# Patient Record
Sex: FEMALE | Race: BLACK OR AFRICAN AMERICAN | NOT HISPANIC OR LATINO | URBAN - METROPOLITAN AREA
[De-identification: names, ages, dates, MRNs, and addresses within clinical notes are randomized per-mention and may not be internally consistent; named-entity substitution may affect disease eponyms.]

---

## 2017-01-18 ENCOUNTER — EMERGENCY (EMERGENCY)
Facility: HOSPITAL | Age: 22
LOS: 0 days | Discharge: HOME | End: 2017-01-18
Attending: EMERGENCY MEDICINE
Payer: MEDICAID

## 2017-01-18 VITALS
TEMPERATURE: 98 F | DIASTOLIC BLOOD PRESSURE: 74 MMHG | SYSTOLIC BLOOD PRESSURE: 120 MMHG | RESPIRATION RATE: 18 BRPM | OXYGEN SATURATION: 100 % | HEART RATE: 70 BPM | WEIGHT: 165.35 LBS

## 2017-01-18 DIAGNOSIS — J02.9 ACUTE PHARYNGITIS, UNSPECIFIED: ICD-10-CM

## 2017-01-18 DIAGNOSIS — Z88.0 ALLERGY STATUS TO PENICILLIN: ICD-10-CM

## 2017-01-18 DIAGNOSIS — Z90.89 ACQUIRED ABSENCE OF OTHER ORGANS: Chronic | ICD-10-CM

## 2017-01-18 PROCEDURE — 99283 EMERGENCY DEPT VISIT LOW MDM: CPT

## 2017-01-18 RX ORDER — AZITHROMYCIN 500 MG/1
500 TABLET, FILM COATED ORAL ONCE
Qty: 0 | Refills: 0 | Status: COMPLETED | OUTPATIENT
Start: 2017-01-18 | End: 2017-01-18

## 2017-01-18 RX ORDER — IBUPROFEN 200 MG
600 TABLET ORAL ONCE
Qty: 0 | Refills: 0 | Status: COMPLETED | OUTPATIENT
Start: 2017-01-18 | End: 2017-01-18

## 2017-01-18 RX ORDER — AZITHROMYCIN 500 MG/1
1 TABLET, FILM COATED ORAL
Qty: 3 | Refills: 0 | OUTPATIENT
Start: 2017-01-18 | End: 2017-01-21

## 2017-01-18 RX ADMIN — Medication 600 MILLIGRAM(S): at 20:20

## 2017-01-18 RX ADMIN — AZITHROMYCIN 500 MILLIGRAM(S): 500 TABLET, FILM COATED ORAL at 20:20

## 2017-01-18 NOTE — ED PROVIDER NOTE - CONSTITUTIONAL, MLM
normal... Well appearing, well nourished, awake, alert, oriented to person, place, time/situation and in no apparent distress. Speaking in clear full sentences no drooling no nasal flaring no shoulders retractions smiling

## 2017-01-18 NOTE — ED PROVIDER NOTE - THROAT FINDINGS
no vesicles/NO VESICLES/ULCERS/NO TONGUE ELEVATION/no exudate/NO DROOLING/THROAT RED/+ blood noted/NO STRIDOR

## 2017-01-18 NOTE — ED PROVIDER NOTE - OBJECTIVE STATEMENT
21 years old female walked c/o sore throat 4/10 cough nasal congestions for 3 days. Pt also c/o spitting bright red blood but no clots today. Pt sts she has a hx of tonsillectomy couple months ago. Pt denies headache, dizziness, sob, chest pain, difficulty of swallowing, nausea, vomiting, fever, chills, abd pain, dysuria, vaginal spotting or discharge or irregular bowel movements.

## 2017-02-07 ENCOUNTER — EMERGENCY (EMERGENCY)
Facility: HOSPITAL | Age: 22
LOS: 0 days | Discharge: HOME | End: 2017-02-08
Attending: EMERGENCY MEDICINE
Payer: MEDICAID

## 2017-02-07 VITALS
SYSTOLIC BLOOD PRESSURE: 108 MMHG | DIASTOLIC BLOOD PRESSURE: 71 MMHG | HEIGHT: 62 IN | OXYGEN SATURATION: 98 % | WEIGHT: 154.1 LBS | TEMPERATURE: 98 F | RESPIRATION RATE: 16 BRPM | HEART RATE: 16 BPM

## 2017-02-07 DIAGNOSIS — Z90.89 ACQUIRED ABSENCE OF OTHER ORGANS: Chronic | ICD-10-CM

## 2017-02-07 DIAGNOSIS — Z88.0 ALLERGY STATUS TO PENICILLIN: ICD-10-CM

## 2017-02-07 DIAGNOSIS — O21.9 VOMITING OF PREGNANCY, UNSPECIFIED: ICD-10-CM

## 2017-02-07 DIAGNOSIS — R11.10 VOMITING, UNSPECIFIED: ICD-10-CM

## 2017-02-07 PROCEDURE — 99284 EMERGENCY DEPT VISIT MOD MDM: CPT

## 2017-02-07 RX ORDER — SODIUM CHLORIDE 9 MG/ML
3 INJECTION INTRAMUSCULAR; INTRAVENOUS; SUBCUTANEOUS ONCE
Qty: 0 | Refills: 0 | Status: COMPLETED | OUTPATIENT
Start: 2017-02-07 | End: 2017-02-07

## 2017-02-07 RX ORDER — SODIUM CHLORIDE 9 MG/ML
1000 INJECTION INTRAMUSCULAR; INTRAVENOUS; SUBCUTANEOUS ONCE
Qty: 0 | Refills: 0 | Status: COMPLETED | OUTPATIENT
Start: 2017-02-07 | End: 2017-02-07

## 2017-02-07 RX ORDER — METOCLOPRAMIDE HCL 10 MG
10 TABLET ORAL ONCE
Qty: 0 | Refills: 0 | Status: COMPLETED | OUTPATIENT
Start: 2017-02-07 | End: 2017-02-08

## 2017-02-07 RX ORDER — KETOROLAC TROMETHAMINE 30 MG/ML
30 SYRINGE (ML) INJECTION ONCE
Qty: 0 | Refills: 0 | Status: DISCONTINUED | OUTPATIENT
Start: 2017-02-07 | End: 2017-02-07

## 2017-02-07 RX ORDER — PANTOPRAZOLE SODIUM 20 MG/1
40 TABLET, DELAYED RELEASE ORAL ONCE
Qty: 0 | Refills: 0 | Status: COMPLETED | OUTPATIENT
Start: 2017-02-07 | End: 2017-02-07

## 2017-02-07 NOTE — ED ADULT TRIAGE NOTE - CHIEF COMPLAINT QUOTE
I have been vomiting all day, around 4 times, and I have the chills.  I did have a fever earlier and I took tylenol around noon.  (no fever at triage)

## 2017-02-08 VITALS
DIASTOLIC BLOOD PRESSURE: 65 MMHG | SYSTOLIC BLOOD PRESSURE: 125 MMHG | OXYGEN SATURATION: 99 % | HEART RATE: 91 BPM | RESPIRATION RATE: 16 BRPM | TEMPERATURE: 98 F

## 2017-02-08 LAB
ALBUMIN SERPL ELPH-MCNC: 3.8 G/DL — SIGNIFICANT CHANGE UP (ref 3.3–5)
ALP SERPL-CCNC: 78 U/L — SIGNIFICANT CHANGE UP (ref 40–120)
ALT FLD-CCNC: 19 U/L — SIGNIFICANT CHANGE UP (ref 12–78)
ANION GAP SERPL CALC-SCNC: 9 MMOL/L — SIGNIFICANT CHANGE UP (ref 5–17)
APPEARANCE UR: CLEAR — SIGNIFICANT CHANGE UP
AST SERPL-CCNC: 14 U/L — LOW (ref 15–37)
BASOPHILS # BLD AUTO: 0.1 K/UL — SIGNIFICANT CHANGE UP (ref 0–0.2)
BASOPHILS NFR BLD AUTO: 1 % — SIGNIFICANT CHANGE UP (ref 0–2)
BILIRUB SERPL-MCNC: 0.2 MG/DL — SIGNIFICANT CHANGE UP (ref 0.2–1.2)
BILIRUB UR-MCNC: NEGATIVE — SIGNIFICANT CHANGE UP
BUN SERPL-MCNC: 13 MG/DL — SIGNIFICANT CHANGE UP (ref 7–23)
CALCIUM SERPL-MCNC: 8.4 MG/DL — LOW (ref 8.5–10.1)
CHLORIDE SERPL-SCNC: 107 MMOL/L — SIGNIFICANT CHANGE UP (ref 96–108)
CO2 SERPL-SCNC: 24 MMOL/L — SIGNIFICANT CHANGE UP (ref 22–31)
COLOR SPEC: YELLOW — SIGNIFICANT CHANGE UP
CREAT SERPL-MCNC: 0.84 MG/DL — SIGNIFICANT CHANGE UP (ref 0.5–1.3)
DIFF PNL FLD: NEGATIVE — SIGNIFICANT CHANGE UP
EOSINOPHIL # BLD AUTO: 0.2 K/UL — SIGNIFICANT CHANGE UP (ref 0–0.5)
EOSINOPHIL NFR BLD AUTO: 1.5 % — SIGNIFICANT CHANGE UP (ref 0–6)
GLUCOSE SERPL-MCNC: 75 MG/DL — SIGNIFICANT CHANGE UP (ref 70–99)
GLUCOSE UR QL: NEGATIVE MG/DL — SIGNIFICANT CHANGE UP
HCG SERPL-ACNC: 3646 MIU/ML — SIGNIFICANT CHANGE UP
HCT VFR BLD CALC: 35.5 % — SIGNIFICANT CHANGE UP (ref 34.5–45)
HGB BLD-MCNC: 12.5 G/DL — SIGNIFICANT CHANGE UP (ref 11.5–15.5)
KETONES UR-MCNC: NEGATIVE — SIGNIFICANT CHANGE UP
LEUKOCYTE ESTERASE UR-ACNC: NEGATIVE — SIGNIFICANT CHANGE UP
LYMPHOCYTES # BLD AUTO: 19.4 % — SIGNIFICANT CHANGE UP (ref 13–44)
LYMPHOCYTES # BLD AUTO: 2 K/UL — SIGNIFICANT CHANGE UP (ref 1–3.3)
MCHC RBC-ENTMCNC: 30.4 PG — SIGNIFICANT CHANGE UP (ref 27–34)
MCHC RBC-ENTMCNC: 35.3 GM/DL — SIGNIFICANT CHANGE UP (ref 32–36)
MCV RBC AUTO: 86.2 FL — SIGNIFICANT CHANGE UP (ref 80–100)
MONOCYTES # BLD AUTO: 1 K/UL — HIGH (ref 0–0.9)
MONOCYTES NFR BLD AUTO: 9.4 % — SIGNIFICANT CHANGE UP (ref 2–14)
NEUTROPHILS # BLD AUTO: 7.1 K/UL — SIGNIFICANT CHANGE UP (ref 1.8–7.4)
NEUTROPHILS NFR BLD AUTO: 68.7 % — SIGNIFICANT CHANGE UP (ref 43–77)
NITRITE UR-MCNC: NEGATIVE — SIGNIFICANT CHANGE UP
PH UR: 6 — SIGNIFICANT CHANGE UP (ref 4.8–8)
PLATELET # BLD AUTO: 296 K/UL — SIGNIFICANT CHANGE UP (ref 150–400)
POTASSIUM SERPL-MCNC: 3.5 MMOL/L — SIGNIFICANT CHANGE UP (ref 3.5–5.3)
POTASSIUM SERPL-SCNC: 3.5 MMOL/L — SIGNIFICANT CHANGE UP (ref 3.5–5.3)
PROT SERPL-MCNC: 7.3 GM/DL — SIGNIFICANT CHANGE UP (ref 6–8.3)
PROT UR-MCNC: NEGATIVE MG/DL — SIGNIFICANT CHANGE UP
RBC # BLD: 4.11 M/UL — SIGNIFICANT CHANGE UP (ref 3.8–5.2)
RBC # FLD: 12.7 % — SIGNIFICANT CHANGE UP (ref 11–15)
SODIUM SERPL-SCNC: 140 MMOL/L — SIGNIFICANT CHANGE UP (ref 135–145)
SP GR SPEC: 1.02 — SIGNIFICANT CHANGE UP (ref 1.01–1.02)
UROBILINOGEN FLD QL: NEGATIVE MG/DL — SIGNIFICANT CHANGE UP
WBC # BLD: 10.3 K/UL — SIGNIFICANT CHANGE UP (ref 3.8–10.5)
WBC # FLD AUTO: 10.3 K/UL — SIGNIFICANT CHANGE UP (ref 3.8–10.5)

## 2017-02-08 RX ORDER — METOCLOPRAMIDE HCL 10 MG
1 TABLET ORAL
Qty: 12 | Refills: 0 | OUTPATIENT
Start: 2017-02-08 | End: 2017-02-11

## 2017-02-08 RX ADMIN — Medication 10 MILLIGRAM(S): at 01:05

## 2017-02-08 RX ADMIN — PANTOPRAZOLE SODIUM 40 MILLIGRAM(S): 20 TABLET, DELAYED RELEASE ORAL at 01:05

## 2017-02-08 RX ADMIN — SODIUM CHLORIDE 1000 MILLILITER(S): 9 INJECTION INTRAMUSCULAR; INTRAVENOUS; SUBCUTANEOUS at 00:10

## 2017-02-08 RX ADMIN — SODIUM CHLORIDE 3 MILLILITER(S): 9 INJECTION INTRAMUSCULAR; INTRAVENOUS; SUBCUTANEOUS at 01:07

## 2017-02-08 RX ADMIN — Medication 30 MILLIGRAM(S): at 01:06

## 2017-02-08 RX ADMIN — Medication 30 MILLIGRAM(S): at 02:42

## 2017-02-08 NOTE — ED PROVIDER NOTE - OBJECTIVE STATEMENT
Pertinent PMH/PSH/FHx/SHx and Review of Systems contained within:  20 yo f with no PMH presents in ED c/o nbnb vomitus. No aggravating or relieving factors, No fever/chills, No photophobia/eye pain/changes in vision, No ear pain/sore throat/dysphagia, No chest pain/palpitations, no SOB/cough/wheeze/stridor, No abdominal pain, No D, no dysuria/frequency/discharge, No neck/back pain, no rash, no changes in neurological status/function.

## 2017-02-08 NOTE — ED PROVIDER NOTE - MEDICAL DECISION MAKING DETAILS
Patient with vomiting secondary to pregnancy. Labs reviewed. Patient received reglan and protonix. Patient currently in good condition and discharged with care instructions. patient is to follow up with pmd and ob. Discussed results and outcome of testing with the patient.  Patient advised to please follow up with their primary care doctor within the next 24 hours and return to the Emergency Department for worsening symptoms or any other concerns.  Patient advised that their doctor may call  to follow up on the specific results of the tests performed today in the emergency department.

## 2017-02-09 LAB
CULTURE RESULTS: SIGNIFICANT CHANGE UP
SPECIMEN SOURCE: SIGNIFICANT CHANGE UP

## 2017-04-25 ENCOUNTER — EMERGENCY (EMERGENCY)
Facility: HOSPITAL | Age: 22
LOS: 1 days | Discharge: HOME | End: 2017-04-25
Attending: EMERGENCY MEDICINE | Admitting: EMERGENCY MEDICINE
Payer: MEDICAID

## 2017-04-25 VITALS
TEMPERATURE: 98 F | DIASTOLIC BLOOD PRESSURE: 74 MMHG | OXYGEN SATURATION: 100 % | HEART RATE: 102 BPM | RESPIRATION RATE: 15 BRPM | SYSTOLIC BLOOD PRESSURE: 115 MMHG

## 2017-04-25 DIAGNOSIS — Z90.89 ACQUIRED ABSENCE OF OTHER ORGANS: Chronic | ICD-10-CM

## 2017-04-25 PROCEDURE — 99284 EMERGENCY DEPT VISIT MOD MDM: CPT

## 2017-04-25 RX ORDER — ACETAMINOPHEN 500 MG
650 TABLET ORAL ONCE
Qty: 0 | Refills: 0 | Status: COMPLETED | OUTPATIENT
Start: 2017-04-25 | End: 2017-04-25

## 2017-04-25 RX ORDER — IBUPROFEN 200 MG
400 TABLET ORAL ONCE
Qty: 0 | Refills: 0 | Status: COMPLETED | OUTPATIENT
Start: 2017-04-25 | End: 2017-04-25

## 2017-04-25 RX ADMIN — Medication 650 MILLIGRAM(S): at 11:53

## 2017-04-25 RX ADMIN — Medication 400 MILLIGRAM(S): at 11:53

## 2017-04-25 NOTE — ED PROVIDER NOTE - MEDICAL DECISION MAKING DETAILS
22 y/o F pt presents to the ED with throat pain and cough x3 days. Probable URI. Pt is very well appearing. Plan: Tylenol, Motrin, JUAN home.

## 2017-04-25 NOTE — ED PROVIDER NOTE - NS ED MD SCRIBE ATTENDING SCRIBE SECTIONS
HIV/DISPOSITION/PAST MEDICAL/SURGICAL/SOCIAL HISTORY/REVIEW OF SYSTEMS/VITAL SIGNS( Pullset)/PHYSICAL EXAM/HISTORY OF PRESENT ILLNESS

## 2017-04-25 NOTE — ED PROVIDER NOTE - CARE PLAN
Principal Discharge DX:	URI, acute  Goal:	supportive care  Instructions for follow-up, activity and diet:	Followup with your doctor in the next few days.  Drink plenty of fluid.  Take tylenol 650 mg every 6 hours as needed for pain and/or ibuprofen 200 mg every 8 hours as needed for pain.  If worse pain, fever, pus, difficulty eating or drinking, persistent fever, worse cough or shortness of breath, or any worse symptoms return to the emergency department.

## 2017-04-25 NOTE — ED PROVIDER NOTE - OBJECTIVE STATEMENT
20 y/o F pt with no significant PMHx, PSHx of tonsillectomy (SEPT 2016) c/o throat pain x3 days worse with swallowing with associated body aches and cough. Pt has been taking Theraflu and Mucinex to control the cough with no relief. Pt took 3 Motrin yesterday with no relief. Pt got the flu shot this year. Pt has a PMD. Denies fever, sick contacts, recent travel, chance of pregnancy or any other complaints. Allergic to penicillin (hives) LMP: 3/16/17

## 2017-04-25 NOTE — ED PROVIDER NOTE - PLAN OF CARE
supportive care Followup with your doctor in the next few days.  Drink plenty of fluid.  Take tylenol 650 mg every 6 hours as needed for pain and/or ibuprofen 200 mg every 8 hours as needed for pain.  If worse pain, fever, pus, difficulty eating or drinking, persistent fever, worse cough or shortness of breath, or any worse symptoms return to the emergency department.

## 2017-11-21 ENCOUNTER — EMERGENCY (EMERGENCY)
Facility: HOSPITAL | Age: 22
LOS: 0 days | Discharge: HOME | End: 2017-11-22
Attending: EMERGENCY MEDICINE
Payer: MEDICAID

## 2017-11-21 VITALS
RESPIRATION RATE: 17 BRPM | OXYGEN SATURATION: 100 % | SYSTOLIC BLOOD PRESSURE: 133 MMHG | DIASTOLIC BLOOD PRESSURE: 70 MMHG | TEMPERATURE: 98 F | WEIGHT: 167.99 LBS | HEIGHT: 62 IN | HEART RATE: 90 BPM

## 2017-11-21 DIAGNOSIS — Z90.89 ACQUIRED ABSENCE OF OTHER ORGANS: Chronic | ICD-10-CM

## 2017-11-21 PROCEDURE — 99283 EMERGENCY DEPT VISIT LOW MDM: CPT | Mod: 25

## 2017-11-22 DIAGNOSIS — R04.2 HEMOPTYSIS: ICD-10-CM

## 2017-11-22 DIAGNOSIS — Z88.0 ALLERGY STATUS TO PENICILLIN: ICD-10-CM

## 2017-11-22 DIAGNOSIS — A38.9 SCARLET FEVER, UNCOMPLICATED: ICD-10-CM

## 2017-11-22 NOTE — ED PROVIDER NOTE - OBJECTIVE STATEMENT
22 year old female without significant PMH presenting to ED due to cough noted x 2 days, noted some blood streaked sputum earlier today - x 2 episodes, not grossly bloody. Denies any active bleeding or discomfort at this time.

## 2017-11-22 NOTE — ED ADULT NURSE NOTE - OBJECTIVE STATEMENT
pt came in with c/o coughing out blood with mild pleuritic chest pain, denies any sob, not in any distress, will monitor

## 2018-01-01 ENCOUNTER — OUTPATIENT (OUTPATIENT)
Dept: OUTPATIENT SERVICES | Facility: HOSPITAL | Age: 23
LOS: 1 days | End: 2018-01-01
Payer: MEDICAID

## 2018-01-01 DIAGNOSIS — Z90.89 ACQUIRED ABSENCE OF OTHER ORGANS: Chronic | ICD-10-CM

## 2018-01-01 PROCEDURE — G9001: CPT

## 2018-01-10 ENCOUNTER — EMERGENCY (EMERGENCY)
Facility: HOSPITAL | Age: 23
LOS: 0 days | Discharge: HOME | End: 2018-01-10
Attending: EMERGENCY MEDICINE
Payer: MEDICAID

## 2018-01-10 VITALS
RESPIRATION RATE: 18 BRPM | OXYGEN SATURATION: 99 % | TEMPERATURE: 99 F | DIASTOLIC BLOOD PRESSURE: 78 MMHG | SYSTOLIC BLOOD PRESSURE: 115 MMHG | HEART RATE: 78 BPM

## 2018-01-10 VITALS
SYSTOLIC BLOOD PRESSURE: 123 MMHG | DIASTOLIC BLOOD PRESSURE: 72 MMHG | HEIGHT: 62 IN | TEMPERATURE: 98 F | OXYGEN SATURATION: 96 % | WEIGHT: 207.23 LBS | HEART RATE: 90 BPM | RESPIRATION RATE: 17 BRPM

## 2018-01-10 DIAGNOSIS — R69 ILLNESS, UNSPECIFIED: ICD-10-CM

## 2018-01-10 DIAGNOSIS — Z90.89 ACQUIRED ABSENCE OF OTHER ORGANS: Chronic | ICD-10-CM

## 2018-01-10 LAB
ALBUMIN SERPL ELPH-MCNC: 3.9 G/DL — SIGNIFICANT CHANGE UP (ref 3.3–5)
ALP SERPL-CCNC: 105 U/L — SIGNIFICANT CHANGE UP (ref 40–120)
ALT FLD-CCNC: 19 U/L — SIGNIFICANT CHANGE UP (ref 12–78)
ANION GAP SERPL CALC-SCNC: 7 MMOL/L — SIGNIFICANT CHANGE UP (ref 5–17)
APPEARANCE UR: CLEAR — SIGNIFICANT CHANGE UP
APTT BLD: 33.3 SEC — SIGNIFICANT CHANGE UP (ref 27.5–37.4)
AST SERPL-CCNC: 13 U/L — LOW (ref 15–37)
BACTERIA # UR AUTO: ABNORMAL
BASOPHILS # BLD AUTO: 0.1 K/UL — SIGNIFICANT CHANGE UP (ref 0–0.2)
BASOPHILS NFR BLD AUTO: 1 % — SIGNIFICANT CHANGE UP (ref 0–2)
BILIRUB SERPL-MCNC: 0.3 MG/DL — SIGNIFICANT CHANGE UP (ref 0.2–1.2)
BILIRUB UR-MCNC: NEGATIVE — SIGNIFICANT CHANGE UP
BUN SERPL-MCNC: 14 MG/DL — SIGNIFICANT CHANGE UP (ref 7–23)
CALCIUM SERPL-MCNC: 9 MG/DL — SIGNIFICANT CHANGE UP (ref 8.5–10.1)
CHLORIDE SERPL-SCNC: 107 MMOL/L — SIGNIFICANT CHANGE UP (ref 96–108)
CO2 SERPL-SCNC: 27 MMOL/L — SIGNIFICANT CHANGE UP (ref 22–31)
COLOR SPEC: YELLOW — SIGNIFICANT CHANGE UP
COMMENT - URINE: SIGNIFICANT CHANGE UP
CREAT SERPL-MCNC: 0.86 MG/DL — SIGNIFICANT CHANGE UP (ref 0.5–1.3)
DIFF PNL FLD: ABNORMAL
EOSINOPHIL # BLD AUTO: 0.1 K/UL — SIGNIFICANT CHANGE UP (ref 0–0.5)
EOSINOPHIL NFR BLD AUTO: 0.5 % — SIGNIFICANT CHANGE UP (ref 0–6)
EPI CELLS # UR: SIGNIFICANT CHANGE UP
FLUAV SPEC QL CULT: NEGATIVE — SIGNIFICANT CHANGE UP
FLUBV AG SPEC QL IA: NEGATIVE — SIGNIFICANT CHANGE UP
GLUCOSE SERPL-MCNC: 87 MG/DL — SIGNIFICANT CHANGE UP (ref 70–99)
GLUCOSE UR QL: NEGATIVE MG/DL — SIGNIFICANT CHANGE UP
HCG SERPL-ACNC: <1 MIU/ML — SIGNIFICANT CHANGE UP
HCT VFR BLD CALC: 41.4 % — SIGNIFICANT CHANGE UP (ref 34.5–45)
HGB BLD-MCNC: 13.7 G/DL — SIGNIFICANT CHANGE UP (ref 11.5–15.5)
INR BLD: 1.11 RATIO — SIGNIFICANT CHANGE UP (ref 0.88–1.16)
KETONES UR-MCNC: ABNORMAL
LACTATE SERPL-SCNC: 1.2 MMOL/L — SIGNIFICANT CHANGE UP (ref 0.7–2)
LEUKOCYTE ESTERASE UR-ACNC: NEGATIVE — SIGNIFICANT CHANGE UP
LIDOCAIN IGE QN: 159 U/L — SIGNIFICANT CHANGE UP (ref 73–393)
LYMPHOCYTES # BLD AUTO: 25.5 % — SIGNIFICANT CHANGE UP (ref 13–44)
LYMPHOCYTES # BLD AUTO: 3.5 K/UL — HIGH (ref 1–3.3)
MCHC RBC-ENTMCNC: 29 PG — SIGNIFICANT CHANGE UP (ref 27–34)
MCHC RBC-ENTMCNC: 33.1 GM/DL — SIGNIFICANT CHANGE UP (ref 32–36)
MCV RBC AUTO: 87.5 FL — SIGNIFICANT CHANGE UP (ref 80–100)
MONOCYTES # BLD AUTO: 0.7 K/UL — SIGNIFICANT CHANGE UP (ref 0–0.9)
MONOCYTES NFR BLD AUTO: 5.5 % — SIGNIFICANT CHANGE UP (ref 2–14)
NEUTROPHILS # BLD AUTO: 9.2 K/UL — HIGH (ref 1.8–7.4)
NEUTROPHILS NFR BLD AUTO: 67.6 % — SIGNIFICANT CHANGE UP (ref 43–77)
NITRITE UR-MCNC: NEGATIVE — SIGNIFICANT CHANGE UP
PH UR: 5 — SIGNIFICANT CHANGE UP (ref 5–8)
PLATELET # BLD AUTO: 329 K/UL — SIGNIFICANT CHANGE UP (ref 150–400)
POTASSIUM SERPL-MCNC: 3.8 MMOL/L — SIGNIFICANT CHANGE UP (ref 3.5–5.3)
POTASSIUM SERPL-SCNC: 3.8 MMOL/L — SIGNIFICANT CHANGE UP (ref 3.5–5.3)
PROT SERPL-MCNC: 8 GM/DL — SIGNIFICANT CHANGE UP (ref 6–8.3)
PROT UR-MCNC: NEGATIVE MG/DL — SIGNIFICANT CHANGE UP
PROTHROM AB SERPL-ACNC: 12.1 SEC — SIGNIFICANT CHANGE UP (ref 9.8–12.7)
RBC # BLD: 4.73 M/UL — SIGNIFICANT CHANGE UP (ref 3.8–5.2)
RBC # FLD: 12.5 % — SIGNIFICANT CHANGE UP (ref 11–15)
RBC CASTS # UR COMP ASSIST: SIGNIFICANT CHANGE UP /HPF (ref 0–4)
SODIUM SERPL-SCNC: 141 MMOL/L — SIGNIFICANT CHANGE UP (ref 135–145)
SP GR SPEC: 1.02 — SIGNIFICANT CHANGE UP (ref 1.01–1.02)
UROBILINOGEN FLD QL: NEGATIVE MG/DL — SIGNIFICANT CHANGE UP
WBC # BLD: 13.6 K/UL — HIGH (ref 3.8–10.5)
WBC # FLD AUTO: 13.6 K/UL — HIGH (ref 3.8–10.5)
WBC UR QL: SIGNIFICANT CHANGE UP

## 2018-01-10 PROCEDURE — 71045 X-RAY EXAM CHEST 1 VIEW: CPT | Mod: 26

## 2018-01-10 PROCEDURE — 99284 EMERGENCY DEPT VISIT MOD MDM: CPT | Mod: 25

## 2018-01-10 RX ORDER — AZITHROMYCIN 500 MG/1
1 TABLET, FILM COATED ORAL
Qty: 4 | Refills: 0 | OUTPATIENT
Start: 2018-01-10 | End: 2018-01-13

## 2018-01-10 RX ORDER — SODIUM CHLORIDE 9 MG/ML
3 INJECTION INTRAMUSCULAR; INTRAVENOUS; SUBCUTANEOUS ONCE
Qty: 0 | Refills: 0 | Status: COMPLETED | OUTPATIENT
Start: 2018-01-10 | End: 2018-01-10

## 2018-01-10 RX ORDER — AZITHROMYCIN 500 MG/1
500 TABLET, FILM COATED ORAL ONCE
Qty: 0 | Refills: 0 | Status: COMPLETED | OUTPATIENT
Start: 2018-01-10 | End: 2018-01-10

## 2018-01-10 RX ORDER — SODIUM CHLORIDE 9 MG/ML
1000 INJECTION INTRAMUSCULAR; INTRAVENOUS; SUBCUTANEOUS ONCE
Qty: 0 | Refills: 0 | Status: COMPLETED | OUTPATIENT
Start: 2018-01-10 | End: 2018-01-10

## 2018-01-10 RX ADMIN — SODIUM CHLORIDE 2000 MILLILITER(S): 9 INJECTION INTRAMUSCULAR; INTRAVENOUS; SUBCUTANEOUS at 04:30

## 2018-01-10 RX ADMIN — SODIUM CHLORIDE 3 MILLILITER(S): 9 INJECTION INTRAMUSCULAR; INTRAVENOUS; SUBCUTANEOUS at 04:30

## 2018-01-10 RX ADMIN — AZITHROMYCIN 500 MILLIGRAM(S): 500 TABLET, FILM COATED ORAL at 04:35

## 2018-01-10 NOTE — ED ADULT NURSE NOTE - CHIEF COMPLAINT QUOTE
" I have the flu symptoms, I have the chills and I have been throwing up and I have been having abdominal pain too"

## 2018-01-10 NOTE — ED PROVIDER NOTE - OBJECTIVE STATEMENT
22yoF; with no signif pmh; now p/w cough. cough--productive, yellow, sputum. denies f/c/s. denies abd pain. c/o diarrhea.  denies n/v.  denies sick contacts. denies travel.    PMH: denies  SOCIAL: No EtOH/tobacco/illicit substance use  ROS: +fever, chills, sweats; denies visual changes or eye pain or discharge; denies sore throat, rhinorrhea, tinnitus, ear pain, hearing changes; denies abd pain, n/v/+d; denies cp/palp; +sob/cough/sputum production; denies back pain, neck pain, muscle aches; denies dysuria, hematuria, frequency, urgency; denies headache; denies numbness/tingling/weakness; denies changes in neurologic status/function; denies rashes

## 2018-01-10 NOTE — ED PROVIDER NOTE - PHYSICAL EXAMINATION
Gen: Alert, NAD  Head: NC, AT, PERRL, EOMI, normal lids/conjunctiva  ENT: B TM WNL, normal hearing, patent oropharynx without erythema/exudate, uvula midline  Neck: +supple, no tenderness/meningismus/JVD, +Trachea midline  Pulm: Bilateral BS, normal resp effort, no wheeze/stridor/retractions  CV: RRR, no M/R/G, +dist pulses  Abd: soft, NT/ND, +BS, no hepatosplenomegaly  Mskel: no edema/erythema/cyanosis  Neuro: AAOx3, no sensory/motor deficits, CN 2-12 intact

## 2018-01-11 DIAGNOSIS — B34.9 VIRAL INFECTION, UNSPECIFIED: ICD-10-CM

## 2018-01-11 DIAGNOSIS — R05 COUGH: ICD-10-CM

## 2018-01-11 LAB
CULTURE RESULTS: SIGNIFICANT CHANGE UP
SPECIMEN SOURCE: SIGNIFICANT CHANGE UP

## 2018-01-30 ENCOUNTER — EMERGENCY (EMERGENCY)
Facility: HOSPITAL | Age: 23
LOS: 0 days | Discharge: HOME | End: 2018-01-31
Attending: EMERGENCY MEDICINE
Payer: MEDICAID

## 2018-01-30 VITALS
OXYGEN SATURATION: 100 % | WEIGHT: 207.9 LBS | SYSTOLIC BLOOD PRESSURE: 103 MMHG | HEART RATE: 81 BPM | DIASTOLIC BLOOD PRESSURE: 67 MMHG | HEIGHT: 62 IN | RESPIRATION RATE: 19 BRPM | TEMPERATURE: 98 F

## 2018-01-30 DIAGNOSIS — Z90.89 ACQUIRED ABSENCE OF OTHER ORGANS: Chronic | ICD-10-CM

## 2018-01-30 PROCEDURE — 93010 ELECTROCARDIOGRAM REPORT: CPT

## 2018-01-30 PROCEDURE — 99285 EMERGENCY DEPT VISIT HI MDM: CPT | Mod: 25

## 2018-01-31 VITALS
TEMPERATURE: 98 F | OXYGEN SATURATION: 98 % | SYSTOLIC BLOOD PRESSURE: 109 MMHG | RESPIRATION RATE: 18 BRPM | DIASTOLIC BLOOD PRESSURE: 74 MMHG | HEART RATE: 84 BPM

## 2018-01-31 DIAGNOSIS — R10.13 EPIGASTRIC PAIN: ICD-10-CM

## 2018-01-31 DIAGNOSIS — R07.9 CHEST PAIN, UNSPECIFIED: ICD-10-CM

## 2018-01-31 DIAGNOSIS — R09.81 NASAL CONGESTION: ICD-10-CM

## 2018-01-31 DIAGNOSIS — A38.9 SCARLET FEVER, UNCOMPLICATED: ICD-10-CM

## 2018-01-31 DIAGNOSIS — Z88.0 ALLERGY STATUS TO PENICILLIN: ICD-10-CM

## 2018-01-31 DIAGNOSIS — K21.9 GASTRO-ESOPHAGEAL REFLUX DISEASE WITHOUT ESOPHAGITIS: ICD-10-CM

## 2018-01-31 LAB
ALBUMIN SERPL ELPH-MCNC: 3.9 G/DL — SIGNIFICANT CHANGE UP (ref 3.3–5)
ALP SERPL-CCNC: 110 U/L — SIGNIFICANT CHANGE UP (ref 40–120)
ALT FLD-CCNC: 18 U/L — SIGNIFICANT CHANGE UP (ref 12–78)
ANION GAP SERPL CALC-SCNC: 11 MMOL/L — SIGNIFICANT CHANGE UP (ref 5–17)
APTT BLD: 32 SEC — SIGNIFICANT CHANGE UP (ref 27.5–37.4)
AST SERPL-CCNC: 10 U/L — LOW (ref 15–37)
BASOPHILS # BLD AUTO: 0.02 K/UL — SIGNIFICANT CHANGE UP (ref 0–0.2)
BASOPHILS NFR BLD AUTO: 0.2 % — SIGNIFICANT CHANGE UP (ref 0–2)
BILIRUB SERPL-MCNC: 0.2 MG/DL — SIGNIFICANT CHANGE UP (ref 0.2–1.2)
BUN SERPL-MCNC: 12 MG/DL — SIGNIFICANT CHANGE UP (ref 7–23)
CALCIUM SERPL-MCNC: 8.7 MG/DL — SIGNIFICANT CHANGE UP (ref 8.5–10.1)
CHLORIDE SERPL-SCNC: 107 MMOL/L — SIGNIFICANT CHANGE UP (ref 96–108)
CK MB BLD-MCNC: <0.3 % — SIGNIFICANT CHANGE UP (ref 0–3.5)
CK MB CFR SERPL CALC: <0.5 NG/ML — SIGNIFICANT CHANGE UP (ref 0.5–3.6)
CK SERPL-CCNC: 153 U/L — SIGNIFICANT CHANGE UP (ref 26–192)
CO2 SERPL-SCNC: 22 MMOL/L — SIGNIFICANT CHANGE UP (ref 22–31)
CREAT SERPL-MCNC: 0.74 MG/DL — SIGNIFICANT CHANGE UP (ref 0.5–1.3)
D DIMER BLD IA.RAPID-MCNC: 171 NG/ML DDU — SIGNIFICANT CHANGE UP
EOSINOPHIL # BLD AUTO: 0.07 K/UL — SIGNIFICANT CHANGE UP (ref 0–0.5)
EOSINOPHIL NFR BLD AUTO: 0.6 % — SIGNIFICANT CHANGE UP (ref 0–6)
GLUCOSE SERPL-MCNC: 95 MG/DL — SIGNIFICANT CHANGE UP (ref 70–99)
HCG SERPL-ACNC: <1 MIU/ML — SIGNIFICANT CHANGE UP
HCT VFR BLD CALC: 40.5 % — SIGNIFICANT CHANGE UP (ref 34.5–45)
HGB BLD-MCNC: 13.1 G/DL — SIGNIFICANT CHANGE UP (ref 11.5–15.5)
IMM GRANULOCYTES NFR BLD AUTO: 0.2 % — SIGNIFICANT CHANGE UP (ref 0–1.5)
INR BLD: 1.13 RATIO — SIGNIFICANT CHANGE UP (ref 0.88–1.16)
LYMPHOCYTES # BLD AUTO: 29.9 % — SIGNIFICANT CHANGE UP (ref 13–44)
LYMPHOCYTES # BLD AUTO: 3.39 K/UL — HIGH (ref 1–3.3)
MCHC RBC-ENTMCNC: 27.3 PG — SIGNIFICANT CHANGE UP (ref 27–34)
MCHC RBC-ENTMCNC: 32.3 GM/DL — SIGNIFICANT CHANGE UP (ref 32–36)
MCV RBC AUTO: 84.6 FL — SIGNIFICANT CHANGE UP (ref 80–100)
MONOCYTES # BLD AUTO: 0.66 K/UL — SIGNIFICANT CHANGE UP (ref 0–0.9)
MONOCYTES NFR BLD AUTO: 5.8 % — SIGNIFICANT CHANGE UP (ref 2–14)
NEUTROPHILS # BLD AUTO: 7.18 K/UL — SIGNIFICANT CHANGE UP (ref 1.8–7.4)
NEUTROPHILS NFR BLD AUTO: 63.3 % — SIGNIFICANT CHANGE UP (ref 43–77)
NRBC # BLD: 0 /100 WBCS — SIGNIFICANT CHANGE UP (ref 0–0)
PLATELET # BLD AUTO: 347 K/UL — SIGNIFICANT CHANGE UP (ref 150–400)
POTASSIUM SERPL-MCNC: 3.8 MMOL/L — SIGNIFICANT CHANGE UP (ref 3.5–5.3)
POTASSIUM SERPL-SCNC: 3.8 MMOL/L — SIGNIFICANT CHANGE UP (ref 3.5–5.3)
PROT SERPL-MCNC: 8.1 GM/DL — SIGNIFICANT CHANGE UP (ref 6–8.3)
PROTHROM AB SERPL-ACNC: 12.4 SEC — SIGNIFICANT CHANGE UP (ref 9.8–12.7)
RBC # BLD: 4.79 M/UL — SIGNIFICANT CHANGE UP (ref 3.8–5.2)
RBC # FLD: 13.7 % — SIGNIFICANT CHANGE UP (ref 10.3–14.5)
SODIUM SERPL-SCNC: 140 MMOL/L — SIGNIFICANT CHANGE UP (ref 135–145)
TROPONIN I SERPL-MCNC: <.015 NG/ML — SIGNIFICANT CHANGE UP (ref 0.01–0.04)
WBC # BLD: 11.34 K/UL — HIGH (ref 3.8–10.5)
WBC # FLD AUTO: 11.34 K/UL — HIGH (ref 3.8–10.5)

## 2018-01-31 PROCEDURE — 71046 X-RAY EXAM CHEST 2 VIEWS: CPT | Mod: 26

## 2018-01-31 RX ORDER — PANTOPRAZOLE SODIUM 20 MG/1
40 TABLET, DELAYED RELEASE ORAL ONCE
Qty: 0 | Refills: 0 | Status: COMPLETED | OUTPATIENT
Start: 2018-01-31 | End: 2018-01-31

## 2018-01-31 RX ORDER — PANTOPRAZOLE SODIUM 20 MG/1
1 TABLET, DELAYED RELEASE ORAL
Qty: 10 | Refills: 0 | OUTPATIENT
Start: 2018-01-31 | End: 2018-02-09

## 2018-01-31 RX ORDER — LIDOCAINE 4 G/100G
5 CREAM TOPICAL ONCE
Qty: 0 | Refills: 0 | Status: COMPLETED | OUTPATIENT
Start: 2018-01-31 | End: 2018-01-31

## 2018-01-31 RX ORDER — SODIUM CHLORIDE 9 MG/ML
3 INJECTION INTRAMUSCULAR; INTRAVENOUS; SUBCUTANEOUS ONCE
Qty: 0 | Refills: 0 | Status: COMPLETED | OUTPATIENT
Start: 2018-01-31 | End: 2018-01-31

## 2018-01-31 RX ADMIN — Medication 30 MILLILITER(S): at 07:45

## 2018-01-31 RX ADMIN — LIDOCAINE 5 MILLILITER(S): 4 CREAM TOPICAL at 07:45

## 2018-01-31 NOTE — ED PROVIDER NOTE - MEDICAL DECISION MAKING DETAILS
labs and imaging reviewed. patient received protonix, maalox and viscous lidocaine. patient currently feels well and is now discharged with care instructions. patient is to follow up with pmd. Discussed results and outcome of testing with the patient.  Patient advised to please follow up with their primary care doctor within the next 24 hours and return to the Emergency Department for worsening symptoms or any other concerns.  Patient advised that their doctor may call  to follow up on the specific results of the tests performed today in the emergency department.

## 2018-01-31 NOTE — ED PROVIDER NOTE - OBJECTIVE STATEMENT
Pertinent PMH/PSH/FHx/SHx and Review of Systems contained within:  23 yo f with no PMH presents in ED c/o 1 week history of chest pain now associated with epigastric pain. patient reports seen last week for same symptoms that were associated with nasal congestion and cough. congestion and cough has since resolved. No aggravating or relieving factors, No fever/chills, No photophobia/eye pain/changes in vision, No ear pain/sore throat/dysphagia, No palpitations, no SOB/wheeze/stridor, No abdominal pain, No N/V/D, no dysuria/frequency/discharge, No neck/back pain, no rash, no changes in neurological status/function.

## 2018-02-02 ENCOUNTER — TRANSCRIPTION ENCOUNTER (OUTPATIENT)
Age: 23
End: 2018-02-02

## 2018-02-09 ENCOUNTER — APPOINTMENT (OUTPATIENT)
Dept: INTERNAL MEDICINE | Facility: CLINIC | Age: 23
End: 2018-02-09
Payer: MEDICAID

## 2018-02-09 VITALS — HEIGHT: 62 IN | BODY MASS INDEX: 38.28 KG/M2 | WEIGHT: 208 LBS

## 2018-02-09 VITALS — HEART RATE: 68 BPM | RESPIRATION RATE: 12 BRPM | SYSTOLIC BLOOD PRESSURE: 104 MMHG | DIASTOLIC BLOOD PRESSURE: 66 MMHG

## 2018-02-09 DIAGNOSIS — Z83.3 FAMILY HISTORY OF DIABETES MELLITUS: ICD-10-CM

## 2018-02-09 DIAGNOSIS — Z30.9 ENCOUNTER FOR CONTRACEPTIVE MANAGEMENT, UNSPECIFIED: ICD-10-CM

## 2018-02-09 DIAGNOSIS — Z78.9 OTHER SPECIFIED HEALTH STATUS: ICD-10-CM

## 2018-02-09 DIAGNOSIS — Z86.19 PERSONAL HISTORY OF OTHER INFECTIOUS AND PARASITIC DISEASES: ICD-10-CM

## 2018-02-09 DIAGNOSIS — K21.9 GASTRO-ESOPHAGEAL REFLUX DISEASE W/OUT ESOPHAGITIS: ICD-10-CM

## 2018-02-09 PROCEDURE — 99203 OFFICE O/P NEW LOW 30 MIN: CPT

## 2018-02-09 RX ORDER — AZITHROMYCIN 250 MG/1
250 TABLET, FILM COATED ORAL
Qty: 4 | Refills: 0 | Status: COMPLETED | COMMUNITY
Start: 2018-01-10

## 2018-02-13 ENCOUNTER — TRANSCRIPTION ENCOUNTER (OUTPATIENT)
Age: 23
End: 2018-02-13

## 2018-04-13 ENCOUNTER — TRANSCRIPTION ENCOUNTER (OUTPATIENT)
Age: 23
End: 2018-04-13

## 2018-07-07 ENCOUNTER — EMERGENCY (EMERGENCY)
Facility: HOSPITAL | Age: 23
LOS: 1 days | Discharge: HOME | End: 2018-07-07
Attending: EMERGENCY MEDICINE | Admitting: EMERGENCY MEDICINE
Payer: MEDICAID

## 2018-07-07 ENCOUNTER — TRANSCRIPTION ENCOUNTER (OUTPATIENT)
Age: 23
End: 2018-07-07

## 2018-07-07 VITALS
HEART RATE: 76 BPM | TEMPERATURE: 98 F | DIASTOLIC BLOOD PRESSURE: 81 MMHG | OXYGEN SATURATION: 100 % | SYSTOLIC BLOOD PRESSURE: 115 MMHG | RESPIRATION RATE: 18 BRPM

## 2018-07-07 DIAGNOSIS — Z90.89 ACQUIRED ABSENCE OF OTHER ORGANS: Chronic | ICD-10-CM

## 2018-07-07 PROCEDURE — 99282 EMERGENCY DEPT VISIT SF MDM: CPT

## 2018-07-07 NOTE — ED ADULT TRIAGE NOTE - TEMPERATURE IN FAHRENHEIT (DEGREES F)
Called and spoke to mom about [facility] appointments on 9/14/17.  Sol will be coming to appointments by herself since she is now 16.   98

## 2018-07-07 NOTE — ED PROVIDER NOTE - PHYSICAL EXAMINATION
ATTENDING PHYSICAL EXAM DR. ARTHUR ***GEN - NAD; well appearing; A+O x3 ***HEAD - NC/AT ***EYES/NOSE - PERRL, EOMI, mucous membranes moist, no discharge ***THROAT: Oral cavity and pharynx normal. No inflammation, swelling, exudate, or lesions.  ***NECK: Neck supple, non-tender without lymphadenopathy, no masses, no thyromegaly.   ***PULMONARY - CTA b/l, symmetric breath sounds. ***CARDIAC -s1s2, RRR, no M,G,R  ***ABDOMEN - +BS, ND, NT, soft, no guarding, no rebound, no masses   ***BACK - no CVA tenderness, Normal  spine ***EXTREMITIES - symmetric pulses, 2+ dp, capillary refill < 2 seconds, no clubbing, no cyanosis, no edema ***SKIN - no rash or bruising   ***NEUROLOGIC - alert, CN 2-12 intact, reflexes nl, sensation nl, coordination nl, finger to nose nl, romberg negative, motor 5/5 RUE/LUE/RLE/LLE/EHL/Plantar flexion, no pronator drift, gait nl, ***PSYCH - insight and judgment nl, memory nl, affect nl, thought nl ATTENDING PHYSICAL EXAM DR. ARTHUR ***GEN - NAD; well appearing; A+O x3 ***HEAD - NC/AT ***EYES/NOSE - PERRL, EOMI, mucous membranes moist, no discharge, erythematous conjuctivita along the right upper and lower eyelid, no FB, no pain with EOM, no proptosis, minimal eyelid swelling, no scleral erythema, no abscess ***THROAT: Oral cavity and pharynx normal. No inflammation, swelling, exudate, or lesions.  ***NECK: Neck supple, non-tender without lymphadenopathy, no masses, no thyromegaly.   ***PULMONARY - CTA b/l, symmetric breath sounds. ***CARDIAC -s1s2, RRR, no M,G,R  ***ABDOMEN - +BS, ND, NT, soft, no guarding, no rebound, no masses   ***BACK - no CVA tenderness, Normal  spine ***EXTREMITIES - symmetric pulses, 2+ dp, capillary refill < 2 seconds, no clubbing, no cyanosis, no edema ***SKIN - no rash or bruising   ***NEUROLOGIC - alert

## 2018-07-07 NOTE — ED PROVIDER NOTE - OBJECTIVE STATEMENT
23 Y/O M with pertinent PMH GERD presents to ED c/o eyelid swelling in left eye x3d. Pt states she was stung by a mosquito about x3 d.  Pt only feels pain when closing eyelid but no during eye movement. Denies recent use of makeup eyeliner. No fever, chill, nausea or vomit. Pt denies any sick contact. Allergy to penicillin; pt states breaks out in hives. No further complaints.

## 2018-08-05 ENCOUNTER — RX RENEWAL (OUTPATIENT)
Age: 23
End: 2018-08-05

## 2018-08-05 RX ORDER — PANTOPRAZOLE 40 MG/1
40 TABLET, DELAYED RELEASE ORAL
Qty: 30 | Refills: 5 | Status: ACTIVE | COMMUNITY
Start: 2018-01-31 | End: 1900-01-01

## 2019-02-11 ENCOUNTER — EMERGENCY (EMERGENCY)
Facility: HOSPITAL | Age: 24
LOS: 0 days | Discharge: HOME | End: 2019-02-11
Payer: SELF-PAY

## 2019-02-11 VITALS
TEMPERATURE: 98 F | SYSTOLIC BLOOD PRESSURE: 121 MMHG | HEART RATE: 90 BPM | RESPIRATION RATE: 17 BRPM | DIASTOLIC BLOOD PRESSURE: 70 MMHG | HEIGHT: 62 IN | WEIGHT: 210.1 LBS | OXYGEN SATURATION: 100 %
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
## 2019-02-11 DIAGNOSIS — M54.5 LOW BACK PAIN: ICD-10-CM
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
## 2019-02-11 DIAGNOSIS — Z90.89 ACQUIRED ABSENCE OF OTHER ORGANS: Chronic | ICD-10-CM

## 2019-02-11 PROCEDURE — 99282 EMERGENCY DEPT VISIT SF MDM: CPT

## 2019-02-11 NOTE — ED PROVIDER NOTE - OBJECTIVE STATEMENT
23F here with intermittent low back pain. She reports back pain for years off and on. More recently started working with a  and now noticed flare of up pain across lower back.  No lower extremity numbness, weakness, or pain. NO bowel or bladder or incontinence. No abdominal pain.

## 2019-02-11 NOTE — ED PROVIDER NOTE - MUSCULOSKELETAL, MLM
Spine appears normal - no midline cervical thoracic or lumbar tenderness, range of motion is not limited, no muscle or joint tenderness

## 2019-02-11 NOTE — ED PROVIDER NOTE - MEDICAL DECISION MAKING DETAILS
Acute on chronic back pain. Do not suspect cord, infectious or intrabdominal pathology. Plan for nsaids, muscle relaxant, PRN, refer to spine for possible MRI/PT given recurrence

## 2019-06-03 ENCOUNTER — EMERGENCY (EMERGENCY)
Facility: HOSPITAL | Age: 24
LOS: 0 days | Discharge: HOME | End: 2019-06-03
Attending: EMERGENCY MEDICINE
Payer: SELF-PAY

## 2019-06-03 VITALS
HEART RATE: 88 BPM | TEMPERATURE: 98 F | RESPIRATION RATE: 18 BRPM | SYSTOLIC BLOOD PRESSURE: 118 MMHG | OXYGEN SATURATION: 97 % | DIASTOLIC BLOOD PRESSURE: 74 MMHG

## 2019-06-03 VITALS
HEIGHT: 62 IN | RESPIRATION RATE: 18 BRPM | OXYGEN SATURATION: 100 % | WEIGHT: 186.95 LBS | HEART RATE: 68 BPM | TEMPERATURE: 98 F | SYSTOLIC BLOOD PRESSURE: 131 MMHG | DIASTOLIC BLOOD PRESSURE: 87 MMHG

## 2019-06-03 DIAGNOSIS — Z88.0 ALLERGY STATUS TO PENICILLIN: ICD-10-CM

## 2019-06-03 DIAGNOSIS — R07.9 CHEST PAIN, UNSPECIFIED: ICD-10-CM

## 2019-06-03 DIAGNOSIS — K21.9 GASTRO-ESOPHAGEAL REFLUX DISEASE WITHOUT ESOPHAGITIS: ICD-10-CM

## 2019-06-03 DIAGNOSIS — Z90.89 ACQUIRED ABSENCE OF OTHER ORGANS: Chronic | ICD-10-CM

## 2019-06-03 DIAGNOSIS — A38.9 SCARLET FEVER, UNCOMPLICATED: ICD-10-CM

## 2019-06-03 LAB
ALBUMIN SERPL ELPH-MCNC: 3.6 G/DL — SIGNIFICANT CHANGE UP (ref 3.3–5)
ALP SERPL-CCNC: 95 U/L — SIGNIFICANT CHANGE UP (ref 40–120)
ALT FLD-CCNC: 14 U/L — SIGNIFICANT CHANGE UP (ref 12–78)
ANION GAP SERPL CALC-SCNC: 4 MMOL/L — LOW (ref 5–17)
AST SERPL-CCNC: 12 U/L — LOW (ref 15–37)
BASOPHILS # BLD AUTO: 0.01 K/UL — SIGNIFICANT CHANGE UP (ref 0–0.2)
BASOPHILS NFR BLD AUTO: 0.1 % — SIGNIFICANT CHANGE UP (ref 0–2)
BILIRUB SERPL-MCNC: 0.4 MG/DL — SIGNIFICANT CHANGE UP (ref 0.2–1.2)
BUN SERPL-MCNC: 10 MG/DL — SIGNIFICANT CHANGE UP (ref 7–23)
CALCIUM SERPL-MCNC: 8.9 MG/DL — SIGNIFICANT CHANGE UP (ref 8.5–10.1)
CHLORIDE SERPL-SCNC: 110 MMOL/L — HIGH (ref 96–108)
CO2 SERPL-SCNC: 29 MMOL/L — SIGNIFICANT CHANGE UP (ref 22–31)
CREAT SERPL-MCNC: 0.94 MG/DL — SIGNIFICANT CHANGE UP (ref 0.5–1.3)
D DIMER BLD IA.RAPID-MCNC: 506 NG/ML DDU — HIGH
EOSINOPHIL # BLD AUTO: 0.07 K/UL — SIGNIFICANT CHANGE UP (ref 0–0.5)
EOSINOPHIL NFR BLD AUTO: 0.7 % — SIGNIFICANT CHANGE UP (ref 0–6)
GLUCOSE SERPL-MCNC: 87 MG/DL — SIGNIFICANT CHANGE UP (ref 70–99)
HCG SERPL-ACNC: <1 MIU/ML — SIGNIFICANT CHANGE UP
HCT VFR BLD CALC: 41.8 % — SIGNIFICANT CHANGE UP (ref 34.5–45)
HGB BLD-MCNC: 13 G/DL — SIGNIFICANT CHANGE UP (ref 11.5–15.5)
IMM GRANULOCYTES NFR BLD AUTO: 0.2 % — SIGNIFICANT CHANGE UP (ref 0–1.5)
LIDOCAIN IGE QN: 125 U/L — SIGNIFICANT CHANGE UP (ref 73–393)
LYMPHOCYTES # BLD AUTO: 3.6 K/UL — HIGH (ref 1–3.3)
LYMPHOCYTES # BLD AUTO: 37.3 % — SIGNIFICANT CHANGE UP (ref 13–44)
MCHC RBC-ENTMCNC: 27.4 PG — SIGNIFICANT CHANGE UP (ref 27–34)
MCHC RBC-ENTMCNC: 31.1 GM/DL — LOW (ref 32–36)
MCV RBC AUTO: 88.2 FL — SIGNIFICANT CHANGE UP (ref 80–100)
MONOCYTES # BLD AUTO: 0.53 K/UL — SIGNIFICANT CHANGE UP (ref 0–0.9)
MONOCYTES NFR BLD AUTO: 5.5 % — SIGNIFICANT CHANGE UP (ref 2–14)
NEUTROPHILS # BLD AUTO: 5.41 K/UL — SIGNIFICANT CHANGE UP (ref 1.8–7.4)
NEUTROPHILS NFR BLD AUTO: 56.2 % — SIGNIFICANT CHANGE UP (ref 43–77)
NRBC # BLD: 0 /100 WBCS — SIGNIFICANT CHANGE UP (ref 0–0)
PLATELET # BLD AUTO: 379 K/UL — SIGNIFICANT CHANGE UP (ref 150–400)
POTASSIUM SERPL-MCNC: 4.4 MMOL/L — SIGNIFICANT CHANGE UP (ref 3.5–5.3)
POTASSIUM SERPL-SCNC: 4.4 MMOL/L — SIGNIFICANT CHANGE UP (ref 3.5–5.3)
PROT SERPL-MCNC: 7.6 GM/DL — SIGNIFICANT CHANGE UP (ref 6–8.3)
RBC # BLD: 4.74 M/UL — SIGNIFICANT CHANGE UP (ref 3.8–5.2)
RBC # FLD: 14.2 % — SIGNIFICANT CHANGE UP (ref 10.3–14.5)
SODIUM SERPL-SCNC: 143 MMOL/L — SIGNIFICANT CHANGE UP (ref 135–145)
WBC # BLD: 9.64 K/UL — SIGNIFICANT CHANGE UP (ref 3.8–10.5)
WBC # FLD AUTO: 9.64 K/UL — SIGNIFICANT CHANGE UP (ref 3.8–10.5)

## 2019-06-03 PROCEDURE — 99285 EMERGENCY DEPT VISIT HI MDM: CPT

## 2019-06-03 PROCEDURE — 71045 X-RAY EXAM CHEST 1 VIEW: CPT | Mod: 26

## 2019-06-03 PROCEDURE — 78582 LUNG VENTILAT&PERFUS IMAGING: CPT | Mod: 26

## 2019-06-03 PROCEDURE — 93010 ELECTROCARDIOGRAM REPORT: CPT

## 2019-06-03 PROCEDURE — 71275 CT ANGIOGRAPHY CHEST: CPT | Mod: 26

## 2019-06-03 RX ORDER — FAMOTIDINE 10 MG/ML
20 INJECTION INTRAVENOUS ONCE
Refills: 0 | Status: COMPLETED | OUTPATIENT
Start: 2019-06-03 | End: 2019-06-03

## 2019-06-03 RX ORDER — SUCRALFATE 1 G
1 TABLET ORAL ONCE
Refills: 0 | Status: COMPLETED | OUTPATIENT
Start: 2019-06-03 | End: 2019-06-03

## 2019-06-03 RX ORDER — OMEPRAZOLE 10 MG/1
1 CAPSULE, DELAYED RELEASE ORAL
Qty: 30 | Refills: 0
Start: 2019-06-03 | End: 2019-07-02

## 2019-06-03 RX ADMIN — FAMOTIDINE 20 MILLIGRAM(S): 10 INJECTION INTRAVENOUS at 12:41

## 2019-06-03 RX ADMIN — Medication 30 MILLILITER(S): at 12:14

## 2019-06-03 RX ADMIN — Medication 1 GRAM(S): at 15:30

## 2019-06-03 NOTE — ED PROVIDER NOTE - OBJECTIVE STATEMENT
24yo female with pmh scarlet fever, GERD previously on protonix presents with SS chest burning that started 40min pta. pain started likely her gerd but got much worse and then had trouble breathing. It has eased off now. + better with sitting forward. no fever, NV, palpitations. Denies recent immobilization, surgery, long trips or plane rides, hormones/OCP, leg pain or swelling or family or personal history of blood clotting disorder     ROS: No fever/chills. No photophobia/eye pain/changes in vision, No ear pain/sore throat/dysphagia, + chest painm bi palpitations. No SOB/cough. No abdominal pain, No N/V/D, no black/bloody bm. No dysuria/frequency/discharge, No headache. No Dizziness.  No rash.  No numbness/tingling/weakness.

## 2019-06-03 NOTE — ED ADULT NURSE NOTE - OBJECTIVE STATEMENT
C/O chest pain 7/10 constant that becomes worse with movement that started this morning.  Patient denies taking anything for pain.  PMH acid reflux

## 2019-06-03 NOTE — ED PROVIDER NOTE - PHYSICAL EXAMINATION
Gen: Alert, Well appearing. NAD    Head: NC, AT, PERRL, normal lids/conjunctiva   ENT: Bilateral TM WNL, patent oropharynx without erythema/exudate, uvula midline  Neck: supple, no tenderness/meningismus  Pulm: Bilateral clear BS, normal resp effort  CV: RRR, no M/R/G, +dist pulses   Abd: soft, NT/ND, +BS, no guarding/rebound tenderness  Mskel: extremities x4 with normal ROM. no ctl spine ttp. no edema/erythema/cyanosis   Skin: no rash, no bruising  Neuro: AAOx3, no sensory/motor deficits, CN 2-12 intact
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
## 2020-05-26 ENCOUNTER — EMERGENCY (EMERGENCY)
Facility: HOSPITAL | Age: 25
LOS: 0 days | Discharge: HOME | End: 2020-05-27
Attending: EMERGENCY MEDICINE
Payer: COMMERCIAL

## 2020-05-26 VITALS
WEIGHT: 205.03 LBS | DIASTOLIC BLOOD PRESSURE: 89 MMHG | OXYGEN SATURATION: 99 % | HEART RATE: 91 BPM | HEIGHT: 62 IN | SYSTOLIC BLOOD PRESSURE: 123 MMHG | TEMPERATURE: 98 F | RESPIRATION RATE: 15 BRPM

## 2020-05-26 DIAGNOSIS — Z90.89 ACQUIRED ABSENCE OF OTHER ORGANS: Chronic | ICD-10-CM

## 2020-05-26 PROCEDURE — 93010 ELECTROCARDIOGRAM REPORT: CPT

## 2020-05-26 PROCEDURE — 70450 CT HEAD/BRAIN W/O DYE: CPT | Mod: 26

## 2020-05-26 PROCEDURE — 99053 MED SERV 10PM-8AM 24 HR FAC: CPT

## 2020-05-26 PROCEDURE — 99284 EMERGENCY DEPT VISIT MOD MDM: CPT

## 2020-05-26 RX ORDER — LIDOCAINE 4 G/100G
1 CREAM TOPICAL ONCE
Refills: 0 | Status: COMPLETED | OUTPATIENT
Start: 2020-05-26 | End: 2020-05-26

## 2020-05-26 RX ORDER — ACETAMINOPHEN 500 MG
975 TABLET ORAL ONCE
Refills: 0 | Status: COMPLETED | OUTPATIENT
Start: 2020-05-26 | End: 2020-05-26

## 2020-05-26 RX ORDER — DIAZEPAM 5 MG
1 TABLET ORAL
Qty: 3 | Refills: 0
Start: 2020-05-26 | End: 2020-05-28

## 2020-05-26 RX ORDER — DIAZEPAM 5 MG
5 TABLET ORAL ONCE
Refills: 0 | Status: DISCONTINUED | OUTPATIENT
Start: 2020-05-26 | End: 2020-05-27

## 2020-05-26 RX ADMIN — Medication 975 MILLIGRAM(S): at 23:36

## 2020-05-26 RX ADMIN — LIDOCAINE 1 PATCH: 4 CREAM TOPICAL at 23:36
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
## 2020-05-26 NOTE — ED PROVIDER NOTE - PROGRESS NOTE DETAILS
Patient re-evaluated. Imaging reviewed. Patient reports feeling better, discharge instructions.   Imaging results (if any), were reviewed with the patient. Patient understands to follow up with their regular doctor. Patient understands to return to the ED is symptoms worsen or progress. Discharge instructions were given to the patient and discussed with patient. Rx (if any) were electronically sent to patient's preferred pharmacy.

## 2020-05-26 NOTE — ED PROVIDER NOTE - PATIENT PORTAL LINK FT
You can access the FollowMyHealth Patient Portal offered by [facility] by registering at the following website: [email or web address]. By joining [employer]’s FollowMyHealth portal, you will also be able to view your health information using other applications (apps) compatible with our system.

## 2020-05-26 NOTE — ED PROVIDER NOTE - PHYSICAL EXAMINATION
PHYSICAL EXAMINATION:  VITALS REVIEWED.  VS normal  GENERALIZED APPEARANCE:  Comfortable, no acute distress, ambulating without difficulty  SKIN:  Warm, dry, no cyanosis  HEAD:  No obvious scalp lesions  EYES:  Conjunctiva pink, no icterus, PERRL, EOMI  ENMT:  Mucus membranes moist, no stridor  NECK:  Supple, non-tender; L sided muscle spasm with hypertonicity  CHEST AND RESPIRATORY:  Clear to auscultation B/L, good air entry B/L, equal chest expansion  HEART AND CARDIOVASCULAR:  Regular rate, no obvious murmur  ABDOMEN AND GI:  Soft, non-tender, non-distended.  No rebound, no guarding, no CVA-area tenderness, no seat belt sign  EXTREMITIES:  No deformity, edema, or calf tenderness  NEURO: AAOx3, gross motor and sensory intact, strength 5/5 B/L UE/LE, reflexes 2+ B/L UE/LE

## 2020-05-26 NOTE — ED ADULT NURSE NOTE - NSIMPLEMENTINTERV_GEN_ALL_ED
Implemented All Universal Safety Interventions:  [place] to call system. Call bell, personal items and telephone within reach. Instruct patient to call for assistance. Room bathroom lighting operational. Non-slip footwear when patient is off stretcher. Physically safe environment: no spills, clutter or unnecessary equipment. Stretcher in lowest position, wheels locked, appropriate side rails in place.
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
## 2020-05-26 NOTE — ED PROVIDER NOTE - NS ED ROS FT
ROS:  GEN: (-) fevers/chills, (-) weight loss, (-) fatigue/malaise  HEENT: (-) visual change, (-) photophobia, (-) nasal congestion/rhinorrhea, (-) difficulty swallowing  NECK: (+) stiffness, (-) swelling  RESP: (-) shortness of breath, (-) cough, (-) sputum, (-) hemoptysis, (-) BAH  CV: (-) chest pain, (-) palpitations, (-) PND/orthopnea  GI: (-) nausea, (-) vomiting, (-) pain, (-) constipation, (-) diarrhea, (-) melena, (-) BRBPR  : (-) frequency/urgency, (-) hematuria, (-) dysuria, (-) incontinence, (-) discharge  EXT: (-) edema, (-) cyanosis  ENDO: (-) heat/cold intolerance, (-) polyuria  NEURO: (-) paresthesias, (-) weakness, (-) headache, (-) dizziness, (-) syncope  MSK: +muscle pain   SKIN: (-) rash
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
## 2020-05-27 VITALS
SYSTOLIC BLOOD PRESSURE: 103 MMHG | TEMPERATURE: 98 F | HEART RATE: 70 BPM | DIASTOLIC BLOOD PRESSURE: 72 MMHG | RESPIRATION RATE: 14 BRPM | OXYGEN SATURATION: 100 %

## 2020-05-27 PROBLEM — K21.9 GASTRO-ESOPHAGEAL REFLUX DISEASE WITHOUT ESOPHAGITIS: Chronic | Status: ACTIVE | Noted: 2019-06-03

## 2020-05-28 DIAGNOSIS — M54.2 CERVICALGIA: ICD-10-CM

## 2020-05-28 DIAGNOSIS — K21.9 GASTRO-ESOPHAGEAL REFLUX DISEASE WITHOUT ESOPHAGITIS: ICD-10-CM
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
## 2020-05-28 DIAGNOSIS — Z88.0 ALLERGY STATUS TO PENICILLIN: ICD-10-CM

## 2020-05-29 ENCOUNTER — EMERGENCY (EMERGENCY)
Facility: HOSPITAL | Age: 25
LOS: 0 days | Discharge: HOME | End: 2020-05-29
Attending: EMERGENCY MEDICINE
Payer: COMMERCIAL

## 2020-05-29 VITALS
WEIGHT: 205.03 LBS | RESPIRATION RATE: 17 BRPM | DIASTOLIC BLOOD PRESSURE: 85 MMHG | HEIGHT: 62 IN | OXYGEN SATURATION: 99 % | TEMPERATURE: 98 F | HEART RATE: 87 BPM | SYSTOLIC BLOOD PRESSURE: 126 MMHG

## 2020-05-29 DIAGNOSIS — Z90.89 ACQUIRED ABSENCE OF OTHER ORGANS: Chronic | ICD-10-CM
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
## 2020-05-29 DIAGNOSIS — Z88.0 ALLERGY STATUS TO PENICILLIN: ICD-10-CM

## 2020-05-29 DIAGNOSIS — S06.0X9A CONCUSSION WITH LOSS OF CONSCIOUSNESS OF UNSPECIFIED DURATION, INITIAL ENCOUNTER: ICD-10-CM

## 2020-05-29 DIAGNOSIS — K21.9 GASTRO-ESOPHAGEAL REFLUX DISEASE WITHOUT ESOPHAGITIS: ICD-10-CM
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
## 2020-05-29 DIAGNOSIS — R51 HEADACHE: ICD-10-CM

## 2020-05-29 DIAGNOSIS — S00.93XA CONTUSION OF UNSPECIFIED PART OF HEAD, INITIAL ENCOUNTER: ICD-10-CM

## 2020-05-29 LAB — HCG UR QL: NEGATIVE — SIGNIFICANT CHANGE UP

## 2020-05-29 PROCEDURE — 73502 X-RAY EXAM HIP UNI 2-3 VIEWS: CPT | Mod: 26,LT

## 2020-05-29 PROCEDURE — 99284 EMERGENCY DEPT VISIT MOD MDM: CPT

## 2020-05-29 RX ORDER — IBUPROFEN 200 MG
600 TABLET ORAL ONCE
Refills: 0 | Status: COMPLETED | OUTPATIENT
Start: 2020-05-29 | End: 2020-05-29

## 2020-05-29 RX ADMIN — Medication 600 MILLIGRAM(S): at 21:37
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
## 2020-05-29 NOTE — ED PROVIDER NOTE - CROS ED ENMT ALL NEG
Problem: Patient Care Overview  Goal: Plan of Care/Patient Progress Review  Outcome: Improving  Pink, active and alert with lusty cry with cares. VSS. Breast feeding fairly well using a nipple shield. Latest feeding baby latched dwell without the shield.Voiding and stooling. Content pc.       negative...

## 2020-05-29 NOTE — ED ADULT NURSE NOTE - NSIMPLEMENTINTERV_GEN_ALL_ED
Implemented All Universal Safety Interventions:  [place] to call system. Call bell, personal items and telephone within reach. Instruct patient to call for assistance. Room bathroom lighting operational. Non-slip footwear when patient is off stretcher. Physically safe environment: no spills, clutter or unnecessary equipment. Stretcher in lowest position, wheels locked, appropriate side rails in place.

## 2020-05-29 NOTE — ED PROVIDER NOTE - NSFOLLOWUPINSTRUCTIONS_ED_ALL_ED_FT
Concussion    WHAT YOU NEED TO KNOW:    A concussion is a mild brain injury. It is usually caused by a bump or blow to the head from a fall, a motor vehicle crash, or a sports injury. Sometimes being shaken forcefully may cause a concussion.    DISCHARGE INSTRUCTIONS:    Have someone call 911 for any of the following:     Someone tries to wake you and cannot do so.      You have a seizure, increasing confusion, or a change in personality.      Your speech becomes slurred, or you have new vision problems.    Return to the emergency department if:     You have sudden and new vision problems.      You have a severe headache that does not go away.      You have arm or leg weakness, numbness, or new problems with coordination.      You have blood or clear fluid coming out of the ears or nose.    Contact your healthcare provider if:     You have nausea or are vomiting.      You feel more sleepy than usual.      Your symptoms get worse.      Your symptoms last longer than 6 weeks after the injury.      You have questions or concerns about your condition or care.    Medicines: You may need any of the following:     Acetaminophen decreases pain and fever. It is available without a doctor's order. Ask how much to take and how often to take it. Follow directions. Read the labels of all other medicines you are using to see if they also contain acetaminophen, or ask your doctor or pharmacist. Acetaminophen can cause liver damage if not taken correctly. Do not use more than 4 grams (4,000 milligrams) total of acetaminophen in one day.       NSAIDs help decrease swelling and pain or fever. This medicine is available with or without a doctor's order. NSAIDs can cause stomach bleeding or kidney problems in certain people. If you take blood thinner medicine, always ask your healthcare provider if NSAIDs are safe for you. Always read the medicine label and follow directions.      Take your medicine as directed. Contact your healthcare provider if you think your medicine is not helping or if you have side effects. Tell him or her if you are allergic to any medicine. Keep a list of the medicines, vitamins, and herbs you take. Include the amounts, and when and why you take them. Bring the list or the pill bottles to follow-up visits. Carry your medicine list with you in case of an emergency.    Self-care: Concussion symptoms usually go away within about 10 days, but they may last longer. The following may be recommended to manage your symptoms:     Rest from physical and mental activities as directed. Mental activities are those that require thinking, concentration, and attention. You will need to rest until your symptoms are gone. Rest will allow you to recover from your concussion. Ask your healthcare provider when you can return to work and other daily activities.      Have someone stay with you for the first 24 hours after your injury. Your healthcare provider should be contacted if your symptoms get worse, or you develop new symptoms.      Do not participate in sports and physical activities until your healthcare provider says it is okay. They could make your symptoms worse or lead to another concussion. Your healthcare provider will tell you when it is okay for you to return to sports or physical activities. Ask for more information about sports concussions.    Prevent another concussion:     Wear protective sports equipment that fits properly. Helmets help decrease your risk for a serious brain injury. Talk to your healthcare provider about ways you can decrease your risk for a concussion if you play sports.      Wear your seatbelt every time you travel. This helps to decrease your risk for a head injury if you are in a car accident.     Follow up with your healthcare provider as directed: Write down your questions so you remember to ask them during your visits.     FOLLOW UP EVALUATION  To ensure optimal concussion recovery, follow up with a doctor specialized in concussion management. An evaluation by a specialty concussion program can ensure timely return to activities.    We offer appointments through the [facility]’s Hotline at [phone].      Contusion in Adults    WHAT YOU NEED TO KNOW:    A contusion is a bruise that appears on your skin after an injury. A bruise happens when small blood vessels tear but skin does not. When blood vessels tear, blood leaks into nearby tissue, such as soft tissue or muscle.    DISCHARGE INSTRUCTIONS:    Return to the emergency department if:     You have new trouble moving the injured area.      You have tingling or numbness in or near the injured area.      Your hand or foot below the bruise gets cold or turns pale.     Contact your healthcare provider if:     You find a new lump in the injured area.      Your symptoms do not improve with treatment after 4 to 5 days.      You have questions or concerns about your condition or care.    Medicines: You may need any of the following:     NSAIDs help decrease swelling and pain or fever. This medicine is available with or without a doctor's order. NSAIDs can cause stomach bleeding or kidney problems in certain people. If you take blood thinner medicine, always ask your healthcare provider if NSAIDs are safe for you. Always read the medicine label and follow directions.      Prescription pain medicine may be given. Do not wait until the pain is severe before you take your medicine.      Take your medicine as directed. Contact your healthcare provider if you think your medicine is not helping or if you have side effects. Tell him of her if you are allergic to any medicine. Keep a list of the medicines, vitamins, and herbs you take. Include the amounts, and when and why you take them. Bring the list or the pill bottles to follow-up visits. Carry your medicine list with you in case of an emergency.    Follow up with your healthcare provider as directed: You may need to return within a week to check your injury again. Write down your questions so you remember to ask them during your visits.    Help a contusion heal:     Rest the injured area or use it less than usual. If you bruised your leg or foot, you may need crutches or a cane to help you walk. This will help you keep weight off your injured body part.       Apply ice to decrease swelling and pain. Ice may also help prevent tissue damage. Use an ice pack, or put crushed ice in a plastic bag. Cover it with a towel and place it on your bruise for 15 to 20 minutes every hour or as directed.      Use compression to support the area and decrease swelling. Wrap an elastic bandage around the area over the bruised muscle. Make sure the bandage is not too tight. You should be able to fit 1 finger between the bandage and your skin.      Elevate (raise) your injured body part above the level of your heart to help decrease pain and swelling. Use pillows, blankets, or rolled towels to elevate the area as often as you can.      Do not drink alcohol as directed. Alcohol may slow healing.      Do not stretch injured muscles right after your injury. Ask your healthcare provider when and how you may safely stretch after your injury. Gentle stretches can help increase your flexibility.      Do not massage the area or put heating pads on the bruise right after your injury. Heat and massage may slow healing. Your healthcare provider may tell you to apply heat after several days. At that time, heat will start to help the injury heal.    Prevent another contusion:     Stretch and warm up before you play sports or exercise.      Wear protective gear when you play sports. Examples are shin guards and padding.       If you begin a new physical activity, start slowly to give your body a chance to adjust.

## 2020-08-05 NOTE — ED ADULT NURSE NOTE - CAS DISCH ACCOMP BY
Patient also wants to let  know that on 7/13 and 7/16 she had a blood transfusion at [facility].     Requested IC discharge summary from [place] [facility]   Family

## 2020-12-15 ENCOUNTER — APPOINTMENT (OUTPATIENT)
Dept: ANTEPARTUM | Facility: CLINIC | Age: 25
End: 2020-12-15
Payer: MEDICAID

## 2020-12-15 ENCOUNTER — ASOB RESULT (OUTPATIENT)
Age: 25
End: 2020-12-15

## 2020-12-15 PROCEDURE — 76801 OB US < 14 WKS SINGLE FETUS: CPT

## 2020-12-15 PROCEDURE — 76813 OB US NUCHAL MEAS 1 GEST: CPT

## 2020-12-15 PROCEDURE — 99072 ADDL SUPL MATRL&STAF TM PHE: CPT

## 2020-12-15 PROCEDURE — 36416 COLLJ CAPILLARY BLOOD SPEC: CPT

## 2021-01-13 ENCOUNTER — TRANSCRIPTION ENCOUNTER (OUTPATIENT)
Age: 26
End: 2021-01-13

## 2021-02-17 ENCOUNTER — APPOINTMENT (OUTPATIENT)
Dept: ANTEPARTUM | Facility: CLINIC | Age: 26
End: 2021-02-17
Payer: MEDICAID

## 2021-02-17 ENCOUNTER — ASOB RESULT (OUTPATIENT)
Age: 26
End: 2021-02-17

## 2021-02-17 PROCEDURE — 99072 ADDL SUPL MATRL&STAF TM PHE: CPT

## 2021-02-17 PROCEDURE — 76811 OB US DETAILED SNGL FETUS: CPT

## 2021-03-24 NOTE — ED ADULT NURSE NOTE - RESPIRATORY WDL
Detail Level: Detailed Depth Of Biopsy: dermis Was A Bandage Applied: Yes Breathing spontaneous and unlabored. Breath sounds clear and equal bilaterally with regular rhythm. Size Of Lesion In Cm: 0 Biopsy Type: H and E Biopsy Method: Personna blade Anesthesia Type: 1% lidocaine with epinephrine Anesthesia Volume In Cc (Will Not Render If 0): 1 Hemostasis: Aluminum Chloride Wound Care: Vaseline Dressing: pressure dressing with telfa Destruction After The Procedure: No Type Of Destruction Used: Curettage Curettage Text: The wound bed was treated with curettage after the biopsy was performed. Cryotherapy Text: The wound bed was treated with cryotherapy after the biopsy was performed. Electrodesiccation Text: The wound bed was treated with electrodesiccation after the biopsy was performed. Electrodesiccation And Curettage Text: The wound bed was treated with electrodesiccation and curettage after the biopsy was performed. Silver Nitrate Text: The wound bed was treated with silver nitrate after the biopsy was performed. [facility]: [facility] [address] [facility] Lab Facility: [address] [facility] Consent: Written consent was obtained and risks were reviewed including but not limited to scarring, infection, bleeding, scabbing, incomplete removal, nerve damage and allergy to anesthesia. Post-Care Instructions: I reviewed with the patient in detail post-care instructions. Patient is to keep the biopsy site dry overnight, and then apply bacitracin twice daily until healed. Patient may apply hydrogen peroxide soaks to remove any crusting. Notification Instructions: Patient will be notified of biopsy results. However, patient instructed to call the office if not contacted within 2 weeks. Billing Type: [employer] Information: Selecting Yes will display possible errors in your note based on the variables you have selected. This validation is only offered as a suggestion for you. PLEASE NOTE THAT THE VALIDATION TEXT WILL BE REMOVED WHEN YOU FINALIZE YOUR NOTE. IF YOU WANT TO FAX A PRELIMINARY NOTE YOU WILL NEED TO TOGGLE THIS TO 'NO' IF YOU DO NOT WANT IT IN YOUR FAXED NOTE. Lab: [id number] Lab Facility: [id number] Billing Type: Third-Party Bill

## 2021-03-26 ENCOUNTER — EMERGENCY (EMERGENCY)
Facility: HOSPITAL | Age: 26
LOS: 1 days | Discharge: STILL ADMITTED OR ADMINISTRATIVE | End: 2021-03-26
Admitting: EMERGENCY MEDICINE
Payer: MEDICAID

## 2021-03-26 ENCOUNTER — OUTPATIENT (OUTPATIENT)
Dept: INPATIENT UNIT | Facility: HOSPITAL | Age: 26
LOS: 1 days | Discharge: HOME | End: 2021-03-26
Payer: MEDICAID

## 2021-03-26 VITALS
HEIGHT: 62 IN | OXYGEN SATURATION: 100 % | TEMPERATURE: 97 F | HEART RATE: 115 BPM | DIASTOLIC BLOOD PRESSURE: 70 MMHG | RESPIRATION RATE: 18 BRPM | SYSTOLIC BLOOD PRESSURE: 110 MMHG

## 2021-03-26 VITALS — SYSTOLIC BLOOD PRESSURE: 128 MMHG | DIASTOLIC BLOOD PRESSURE: 74 MMHG | HEART RATE: 100 BPM

## 2021-03-26 VITALS
HEART RATE: 108 BPM | RESPIRATION RATE: 17 BRPM | DIASTOLIC BLOOD PRESSURE: 68 MMHG | SYSTOLIC BLOOD PRESSURE: 127 MMHG | TEMPERATURE: 98 F

## 2021-03-26 DIAGNOSIS — Z3A.00 WEEKS OF GESTATION OF PREGNANCY NOT SPECIFIED: ICD-10-CM

## 2021-03-26 DIAGNOSIS — Z90.89 ACQUIRED ABSENCE OF OTHER ORGANS: Chronic | ICD-10-CM

## 2021-03-26 DIAGNOSIS — O26.899 OTHER SPECIFIED PREGNANCY RELATED CONDITIONS, UNSPECIFIED TRIMESTER: ICD-10-CM

## 2021-03-26 LAB
APPEARANCE UR: ABNORMAL
BACTERIA # UR AUTO: ABNORMAL
BILIRUB UR-MCNC: NEGATIVE — SIGNIFICANT CHANGE UP
COLOR SPEC: YELLOW — SIGNIFICANT CHANGE UP
DIFF PNL FLD: NEGATIVE — SIGNIFICANT CHANGE UP
EPI CELLS # UR: 9 /HPF — HIGH (ref 0–5)
GLUCOSE UR QL: NEGATIVE — SIGNIFICANT CHANGE UP
HYALINE CASTS # UR AUTO: 17 /LPF — HIGH (ref 0–7)
KETONES UR-MCNC: ABNORMAL
LEUKOCYTE ESTERASE UR-ACNC: NEGATIVE — SIGNIFICANT CHANGE UP
NITRITE UR-MCNC: NEGATIVE — SIGNIFICANT CHANGE UP
PH UR: 6 — SIGNIFICANT CHANGE UP (ref 5–8)
PROT UR-MCNC: ABNORMAL
RBC CASTS # UR COMP ASSIST: 4 /HPF — SIGNIFICANT CHANGE UP (ref 0–4)
SP GR SPEC: 1.03 — HIGH (ref 1.01–1.02)
UROBILINOGEN FLD QL: ABNORMAL
WBC UR QL: 11 /HPF — HIGH (ref 0–5)

## 2021-03-26 PROCEDURE — 99213 OFFICE O/P EST LOW 20 MIN: CPT

## 2021-03-26 PROCEDURE — 76818 FETAL BIOPHYS PROFILE W/NST: CPT | Mod: 26

## 2021-03-26 PROCEDURE — L9993: CPT

## 2021-03-26 PROCEDURE — 76817 TRANSVAGINAL US OBSTETRIC: CPT | Mod: 26

## 2021-03-26 NOTE — OB PROVIDER TRIAGE NOTE - NSHPPHYSICALEXAM_GEN_ALL_CORE
Vital Signs Last 24 Hrs  T(C): 36.8 (26 Mar 2021 21:11), Max: 36.8 (26 Mar 2021 21:11)  T(F): 98.2 (26 Mar 2021 21:11), Max: 98.2 (26 Mar 2021 21:11)  HR: 100 (26 Mar 2021 23:32) (100 - 115)  BP: 128/74 (26 Mar 2021 23:32) (110/70 - 128/74)  RR: 17 (26 Mar 2021 21:11) (17 - 18)  SpO2: 100% (26 Mar 2021 20:30) (100% - 100%)    Abdomen soft, nontender   no cva tenderness    sse cervix appears closed     tvus cl 5.35-5.85 cm, no funneling or dynamic changes     taus cephalic presentation, anterior placenta, corazon 17.69 cm    category 1 tracing, , moderate variability, + accels, no decels   no contractions by toco

## 2021-03-26 NOTE — OB PROVIDER TRIAGE NOTE - HISTORY OF PRESENT ILLNESS
24 yo  26 5/7 weeks with complaints of lower abdominal pain, she reports a sharp pain that came and went at 5 pm, denies pain currently. Denies leaking of fluid or vaginal bleeding.     Current a/p complications: Denies     Allergies: PCN- angioedema  Medications: PNV, tylenol   PMH: Denies   PSH: hx of tonsillectomy as a child   OB/GYN: sab x 1 w/ d&c   top x 1 w/ d&c   hx of fibroids?  Social: Denies   Psychosocial: Denies

## 2021-03-26 NOTE — OB PROVIDER TRIAGE NOTE - NSOBPROVIDERNOTE_OBGYN_ALL_OB_FT
No evidence of  labor     D/w Dr. Ceja     -Cleared for discharge home   -keep scheduled appt   -warning signs reviewed

## 2021-03-26 NOTE — ED ADULT TRIAGE NOTE - CHIEF COMPLAINT QUOTE
FELIPA 6/27/21 first pregnancy, no trauma lower sharp abd pains starting this afternoon, since pain started pt hasn't felt baby moving as much, no vaginal bleeding or discharge -- PT CLEARED FOR L&D

## 2021-03-26 NOTE — OB PROVIDER TRIAGE NOTE - NSHPLABSRESULTS_GEN_ALL_CORE
Urinalysis Basic - ( 26 Mar 2021 22:40 )    Color: Yellow / Appearance: Slightly Turbid / S.031 / pH: x  Gluc: x / Ketone: Trace  / Bili: Negative / Urobili: 3 mg/dL   Blood: x / Protein: 30 mg/dL / Nitrite: Negative   Leuk Esterase: Negative / RBC: 4 /HPF / WBC 11 /HPF   Sq Epi: x / Non Sq Epi: 9 /HPF / Bacteria: Moderate

## 2021-04-12 ENCOUNTER — ASOB RESULT (OUTPATIENT)
Age: 26
End: 2021-04-12

## 2021-04-12 ENCOUNTER — APPOINTMENT (OUTPATIENT)
Dept: ANTEPARTUM | Facility: CLINIC | Age: 26
End: 2021-04-12
Payer: MEDICAID

## 2021-04-12 PROCEDURE — 99072 ADDL SUPL MATRL&STAF TM PHE: CPT

## 2021-04-12 PROCEDURE — 76816 OB US FOLLOW-UP PER FETUS: CPT

## 2021-04-12 PROCEDURE — 76819 FETAL BIOPHYS PROFIL W/O NST: CPT

## 2021-05-10 ENCOUNTER — ASOB RESULT (OUTPATIENT)
Age: 26
End: 2021-05-10

## 2021-05-10 ENCOUNTER — APPOINTMENT (OUTPATIENT)
Dept: ANTEPARTUM | Facility: CLINIC | Age: 26
End: 2021-05-10
Payer: MEDICAID

## 2021-05-10 PROCEDURE — 76816 OB US FOLLOW-UP PER FETUS: CPT

## 2021-05-10 PROCEDURE — 76819 FETAL BIOPHYS PROFIL W/O NST: CPT

## 2021-05-10 PROCEDURE — 99072 ADDL SUPL MATRL&STAF TM PHE: CPT

## 2021-06-01 ENCOUNTER — APPOINTMENT (OUTPATIENT)
Dept: ANTEPARTUM | Facility: CLINIC | Age: 26
End: 2021-06-01

## 2021-06-05 ENCOUNTER — OUTPATIENT (OUTPATIENT)
Dept: INPATIENT UNIT | Facility: HOSPITAL | Age: 26
LOS: 1 days | Discharge: HOME | End: 2021-06-05
Payer: MEDICAID

## 2021-06-05 VITALS
RESPIRATION RATE: 16 BRPM | SYSTOLIC BLOOD PRESSURE: 103 MMHG | TEMPERATURE: 99 F | HEART RATE: 106 BPM | DIASTOLIC BLOOD PRESSURE: 72 MMHG

## 2021-06-05 VITALS — DIASTOLIC BLOOD PRESSURE: 71 MMHG | HEART RATE: 96 BPM | SYSTOLIC BLOOD PRESSURE: 103 MMHG

## 2021-06-05 DIAGNOSIS — Z90.89 ACQUIRED ABSENCE OF OTHER ORGANS: Chronic | ICD-10-CM

## 2021-06-05 DIAGNOSIS — O26.899 OTHER SPECIFIED PREGNANCY RELATED CONDITIONS, UNSPECIFIED TRIMESTER: ICD-10-CM

## 2021-06-05 DIAGNOSIS — Z3A.00 WEEKS OF GESTATION OF PREGNANCY NOT SPECIFIED: ICD-10-CM

## 2021-06-05 PROCEDURE — 76818 FETAL BIOPHYS PROFILE W/NST: CPT | Mod: 26

## 2021-06-05 PROCEDURE — 99215 OFFICE O/P EST HI 40 MIN: CPT | Mod: 25

## 2021-06-05 RX ORDER — ACETAMINOPHEN 500 MG
1000 TABLET ORAL
Qty: 0 | Refills: 0 | DISCHARGE

## 2021-06-05 NOTE — OB PROVIDER TRIAGE NOTE - NSHPPHYSICALEXAM_GEN_ALL_CORE
Alert and Oriented x 3  Lung CTAB  Heart RRR  Abdomen, gravid soft and nontender    FHR- 135 mod, accels no decels    Contractions occasional   CAT 1    Sono:  BPP 8/8  presentation vertex  placenta: anterior   GUILLERMO:14.01    Speculum Exam:  no pooling or fluid noted in the vaginal vault     Vaginal Exam: 0/40/-4    Vital Signs Last 24 Hrs  T(C): 37 (05 Jun 2021 07:24), Max: 37 (05 Jun 2021 07:24)  T(F): 98.6 (05 Jun 2021 07:24), Max: 98.6 (05 Jun 2021 07:24)  HR: 96 (05 Jun 2021 08:31) (96 - 106)  BP: 103/71 (05 Jun 2021 08:31) (96/62 - 115/70)  RR: 16 (05 Jun 2021 07:24) (16 - 16)

## 2021-06-05 NOTE — OB PROVIDER TRIAGE NOTE - HISTORY OF PRESENT ILLNESS
24 y/o F  @36.6 weeks presented to triage with c/o contractions every 3-5 mins with clear discharge leaking since yesterday. Pt endorses +Fm. Denies any current vb or lof.

## 2021-06-05 NOTE — OB PROVIDER TRIAGE NOTE - NS_FHOBSERVED_OBGYN_ALL_OB
HISTORY OF PRESENT ILLNESS  Jordan Chambers is a 35 y.o. female who presents today for follow-up on hypertension. Patient states that her blood pressure has been well controlled. Her main complaint today is cystic acne. Consent:  he and/or  healthcare decision maker is aware that this patient-initiated Telehealth encounter is a billable service, with coverage as determined by her insurance carrier. he is aware that she may receive a bill and has provided verbal consent to proceed: Yes    I was at home while conducting this encounter. .No Known Allergies  Current Outpatient Medications on File Prior to Visit   Medication Sig Dispense Refill    labetaloL (NORMODYNE) 100 mg tablet Take 1 Tab by mouth every eight (8) hours. (Patient taking differently: Take 100 mg by mouth two (2) times a day.) 90 Tab 0    hydroCHLOROthiazide (HYDRODIURIL) 25 mg tablet Take 1 Tab by mouth daily. 30 Tab 0     No current facility-administered medications on file prior to visit. No past medical history on file. No past surgical history on file. Family History   Problem Relation Age of Onset    No Known Problems Mother     No Known Problems Father      Social History     Socioeconomic History    Marital status: UNKNOWN     Spouse name: Not on file    Number of children: Not on file    Years of education: Not on file    Highest education level: Not on file   Occupational History    Not on file   Social Needs    Financial resource strain: Not on file    Food insecurity     Worry: Not on file     Inability: Not on file    Transportation needs     Medical: Not on file     Non-medical: Not on file   Tobacco Use    Smoking status: Never Smoker    Smokeless tobacco: Never Used   Substance and Sexual Activity    Alcohol use:  Yes    Drug use: Never    Sexual activity: Not on file   Lifestyle    Physical activity     Days per week: Not on file     Minutes per session: Not on file    Stress: Not on file   Relationships  Social connections     Talks on phone: Not on file     Gets together: Not on file     Attends Church service: Not on file     Active member of club or organization: Not on file     Attends meetings of clubs or organizations: Not on file     Relationship status: Not on file    Intimate partner violence     Fear of current or ex partner: Not on file     Emotionally abused: Not on file     Physically abused: Not on file     Forced sexual activity: Not on file   Other Topics Concern    Not on file   Social History Narrative    Not on file       Hypertension    The history is provided by the patient. This is a chronic problem. The problem has been gradually improving. Pertinent negatives include no chest pain, no orthopnea, no headaches, no peripheral edema, no dizziness and no shortness of breath. There are no associated agents to hypertension. Risk factors include no risk factors. Acne   The history is provided by the patient. This is a chronic problem. The problem has been gradually improving. Pertinent negatives include no chest pain, no abdominal pain, no headaches and no shortness of breath. Nothing aggravates the symptoms. Nothing relieves the symptoms. She has tried nothing for the symptoms. No Known Allergies  Current Outpatient Medications on File Prior to Visit   Medication Sig Dispense Refill    labetaloL (NORMODYNE) 100 mg tablet Take 1 Tab by mouth every eight (8) hours. (Patient taking differently: Take 100 mg by mouth two (2) times a day.) 90 Tab 0    hydroCHLOROthiazide (HYDRODIURIL) 25 mg tablet Take 1 Tab by mouth daily. 30 Tab 0     No current facility-administered medications on file prior to visit. No past medical history on file. No past surgical history on file.   Family History   Problem Relation Age of Onset    No Known Problems Mother     No Known Problems Father      Social History     Socioeconomic History    Marital status: UNKNOWN     Spouse name: Not on file    Number of children: Not on file    Years of education: Not on file    Highest education level: Not on file   Occupational History    Not on file   Social Needs    Financial resource strain: Not on file    Food insecurity     Worry: Not on file     Inability: Not on file    Transportation needs     Medical: Not on file     Non-medical: Not on file   Tobacco Use    Smoking status: Never Smoker    Smokeless tobacco: Never Used   Substance and Sexual Activity    Alcohol use: Yes    Drug use: Never    Sexual activity: Not on file   Lifestyle    Physical activity     Days per week: Not on file     Minutes per session: Not on file    Stress: Not on file   Relationships    Social connections     Talks on phone: Not on file     Gets together: Not on file     Attends Worship service: Not on file     Active member of club or organization: Not on file     Attends meetings of clubs or organizations: Not on file     Relationship status: Not on file    Intimate partner violence     Fear of current or ex partner: Not on file     Emotionally abused: Not on file     Physically abused: Not on file     Forced sexual activity: Not on file   Other Topics Concern    Not on file   Social History Narrative    Not on file       Review of Systems   Constitutional: Negative. Eyes: Negative. Respiratory: Negative. Negative for shortness of breath. Cardiovascular: Negative. Negative for chest pain and orthopnea. Gastrointestinal: Negative for abdominal pain. Musculoskeletal: Negative. Neurological: Negative. Negative for dizziness and headaches. Endo/Heme/Allergies: Negative. Psychiatric/Behavioral: Negative. There were no vitals taken for this visit. Physical Exam  Nursing note reviewed. Constitutional:       Appearance: She is well-developed. HENT:      Head: Normocephalic and atraumatic. Pulmonary:      Effort: Pulmonary effort is normal. No respiratory distress.    Musculoskeletal: Normal range of motion. General: No tenderness or deformity. Skin:     General: Skin is dry. Coloration: Skin is not pale. Findings: No erythema or rash. Neurological:      Mental Status: She is alert and oriented to person, place, and time. Cranial Nerves: No cranial nerve deficit. Coordination: Coordination normal.   Psychiatric:         Behavior: Behavior normal.         Thought Content: Thought content normal.         Judgment: Judgment normal.         ASSESSMENT and PLAN    ICD-10-CM ICD-9-CM    1. Cystic acne  L70.0 706.1 REFERRAL TO DERMATOLOGY   2. Essential hypertension  I10 401.9    We discussed the expected course, resolution and complications of the diagnosis(es) in detail. Medication risks, benefits, costs, interactions, and alternatives were discussed as indicated. I advised her to contact the office if her condition worsens, changes or fails to improve as anticipated. She expressed understanding with the diagnosis(es) and plan. Pursuant to the emergency declaration under the [facility], [facility] waiver authority and the [employer] Act, this Virtual  Visit was conducted, with patient's consent, to reduce the patient's risk of exposure to COVID-19 and provide continuity of care for an established patient. Services were provided through a video synchronous discussion virtually to substitute for in-person clinic visit. Ashu Hamlin MD      Follow-up and Dispositions    · Return in about 6 months (around 1/21/2021). Yes

## 2021-06-05 NOTE — OB PROVIDER TRIAGE NOTE - NSOBPROVIDERNOTE_OBGYN_ALL_OB_FT
Discussed with Dr. Posada- no evidence of PTL or ROM. maternal and fetal status reassuring.   ok to discharge patient home in stable condition

## 2021-06-07 ENCOUNTER — OUTPATIENT (OUTPATIENT)
Dept: OUTPATIENT SERVICES | Facility: HOSPITAL | Age: 26
LOS: 1 days | End: 2021-06-07

## 2021-06-07 ENCOUNTER — ASOB RESULT (OUTPATIENT)
Age: 26
End: 2021-06-07

## 2021-06-07 ENCOUNTER — APPOINTMENT (OUTPATIENT)
Dept: ANTEPARTUM | Facility: CLINIC | Age: 26
End: 2021-06-07
Payer: MEDICAID

## 2021-06-07 ENCOUNTER — APPOINTMENT (OUTPATIENT)
Dept: ANTEPARTUM | Facility: HOSPITAL | Age: 26
End: 2021-06-07

## 2021-06-07 DIAGNOSIS — Z90.89 ACQUIRED ABSENCE OF OTHER ORGANS: Chronic | ICD-10-CM

## 2021-06-07 PROCEDURE — 76818 FETAL BIOPHYS PROFILE W/NST: CPT | Mod: 26

## 2021-06-07 PROCEDURE — 76816 OB US FOLLOW-UP PER FETUS: CPT

## 2021-06-14 ENCOUNTER — OUTPATIENT (OUTPATIENT)
Dept: OUTPATIENT SERVICES | Facility: HOSPITAL | Age: 26
LOS: 1 days | End: 2021-06-14

## 2021-06-14 ENCOUNTER — ASOB RESULT (OUTPATIENT)
Age: 26
End: 2021-06-14

## 2021-06-14 ENCOUNTER — APPOINTMENT (OUTPATIENT)
Dept: ANTEPARTUM | Facility: CLINIC | Age: 26
End: 2021-06-14
Payer: MEDICAID

## 2021-06-14 ENCOUNTER — APPOINTMENT (OUTPATIENT)
Dept: ANTEPARTUM | Facility: CLINIC | Age: 26
End: 2021-06-14

## 2021-06-14 DIAGNOSIS — Z90.89 ACQUIRED ABSENCE OF OTHER ORGANS: Chronic | ICD-10-CM

## 2021-06-14 PROCEDURE — 76818 FETAL BIOPHYS PROFILE W/NST: CPT | Mod: 26

## 2021-06-20 ENCOUNTER — INPATIENT (INPATIENT)
Facility: HOSPITAL | Age: 26
LOS: 2 days | Discharge: HOME | End: 2021-06-23
Attending: OBSTETRICS & GYNECOLOGY | Admitting: OBSTETRICS & GYNECOLOGY

## 2021-06-20 ENCOUNTER — TRANSCRIPTION ENCOUNTER (OUTPATIENT)
Age: 26
End: 2021-06-20

## 2021-06-20 VITALS
SYSTOLIC BLOOD PRESSURE: 110 MMHG | HEART RATE: 100 BPM | TEMPERATURE: 98 F | DIASTOLIC BLOOD PRESSURE: 74 MMHG | RESPIRATION RATE: 16 BRPM

## 2021-06-20 DIAGNOSIS — O42.92 FULL-TERM PREMATURE RUPTURE OF MEMBRANES, UNSPECIFIED AS TO LENGTH OF TIME BETWEEN RUPTURE AND ONSET OF LABOR: ICD-10-CM

## 2021-06-20 DIAGNOSIS — Z3A.00 WEEKS OF GESTATION OF PREGNANCY NOT SPECIFIED: ICD-10-CM

## 2021-06-20 DIAGNOSIS — Z90.89 ACQUIRED ABSENCE OF OTHER ORGANS: Chronic | ICD-10-CM

## 2021-06-20 DIAGNOSIS — O26.899 OTHER SPECIFIED PREGNANCY RELATED CONDITIONS, UNSPECIFIED TRIMESTER: ICD-10-CM

## 2021-06-20 LAB
BASOPHILS # BLD AUTO: 0.02 K/UL — SIGNIFICANT CHANGE UP (ref 0–0.2)
BASOPHILS NFR BLD AUTO: 0.1 % — SIGNIFICANT CHANGE UP (ref 0–2)
BLD GP AB SCN SERPL QL: NEGATIVE — SIGNIFICANT CHANGE UP
EOSINOPHIL # BLD AUTO: 0.04 K/UL — SIGNIFICANT CHANGE UP (ref 0–0.5)
EOSINOPHIL NFR BLD AUTO: 0.3 % — SIGNIFICANT CHANGE UP (ref 0–6)
HCT VFR BLD CALC: 38 % — SIGNIFICANT CHANGE UP (ref 34.5–45)
HGB BLD-MCNC: 11.7 G/DL — SIGNIFICANT CHANGE UP (ref 11.5–15.5)
IANC: 10.7 K/UL — HIGH (ref 1.5–8.5)
IMM GRANULOCYTES NFR BLD AUTO: 0.6 % — SIGNIFICANT CHANGE UP (ref 0–1.5)
LYMPHOCYTES # BLD AUTO: 16 % — SIGNIFICANT CHANGE UP (ref 13–44)
LYMPHOCYTES # BLD AUTO: 2.2 K/UL — SIGNIFICANT CHANGE UP (ref 1–3.3)
MCHC RBC-ENTMCNC: 26.2 PG — LOW (ref 27–34)
MCHC RBC-ENTMCNC: 30.8 GM/DL — LOW (ref 32–36)
MCV RBC AUTO: 85 FL — SIGNIFICANT CHANGE UP (ref 80–100)
MONOCYTES # BLD AUTO: 0.75 K/UL — SIGNIFICANT CHANGE UP (ref 0–0.9)
MONOCYTES NFR BLD AUTO: 5.4 % — SIGNIFICANT CHANGE UP (ref 2–14)
NEUTROPHILS # BLD AUTO: 10.7 K/UL — HIGH (ref 1.8–7.4)
NEUTROPHILS NFR BLD AUTO: 77.6 % — HIGH (ref 43–77)
NRBC # BLD: 0 /100 WBCS — SIGNIFICANT CHANGE UP
NRBC # FLD: 0 K/UL — SIGNIFICANT CHANGE UP
PLATELET # BLD AUTO: 438 K/UL — HIGH (ref 150–400)
RBC # BLD: 4.47 M/UL — SIGNIFICANT CHANGE UP (ref 3.8–5.2)
RBC # FLD: 15.9 % — HIGH (ref 10.3–14.5)
RH IG SCN BLD-IMP: POSITIVE — SIGNIFICANT CHANGE UP
RH IG SCN BLD-IMP: POSITIVE — SIGNIFICANT CHANGE UP
SARS-COV-2 RNA SPEC QL NAA+PROBE: SIGNIFICANT CHANGE UP
WBC # BLD: 13.79 K/UL — HIGH (ref 3.8–10.5)
WBC # FLD AUTO: 13.79 K/UL — HIGH (ref 3.8–10.5)

## 2021-06-20 RX ORDER — SODIUM CHLORIDE 9 MG/ML
1000 INJECTION, SOLUTION INTRAVENOUS
Refills: 0 | Status: DISCONTINUED | OUTPATIENT
Start: 2021-06-20 | End: 2021-06-20

## 2021-06-20 RX ORDER — OXYTOCIN 10 UNIT/ML
333.33 VIAL (ML) INJECTION
Qty: 20 | Refills: 0 | Status: COMPLETED | OUTPATIENT
Start: 2021-06-20 | End: 2021-06-21

## 2021-06-20 RX ORDER — MORPHINE SULFATE 50 MG/1
4 CAPSULE, EXTENDED RELEASE ORAL ONCE
Refills: 0 | Status: DISCONTINUED | OUTPATIENT
Start: 2021-06-20 | End: 2021-06-20

## 2021-06-20 RX ORDER — OXYTOCIN 10 UNIT/ML
333.33 VIAL (ML) INJECTION
Qty: 20 | Refills: 0 | Status: DISCONTINUED | OUTPATIENT
Start: 2021-06-20 | End: 2021-06-20

## 2021-06-20 RX ORDER — SODIUM CHLORIDE 9 MG/ML
1000 INJECTION, SOLUTION INTRAVENOUS
Refills: 0 | Status: DISCONTINUED | OUTPATIENT
Start: 2021-06-20 | End: 2021-06-21

## 2021-06-20 RX ADMIN — MORPHINE SULFATE 4 MILLIGRAM(S): 50 CAPSULE, EXTENDED RELEASE ORAL at 20:59

## 2021-06-20 RX ADMIN — MORPHINE SULFATE 4 MILLIGRAM(S): 50 CAPSULE, EXTENDED RELEASE ORAL at 23:59

## 2021-06-20 RX ADMIN — SODIUM CHLORIDE 125 MILLILITER(S): 9 INJECTION, SOLUTION INTRAVENOUS at 18:52

## 2021-06-20 NOTE — OB PROVIDER H&P - NSHPPHYSICALEXAM_GEN_ALL_CORE
T(C): 36.8 (06-20-21 @ 16:26), Max: 36.8 (06-20-21 @ 16:26)  HR: 100 (06-20-21 @ 16:34) (100 - 100)  BP: 110/74 (06-20-21 @ 16:34) (110/74 - 110/74)  RR: 16 (06-20-21 @ 16:26) (16 - 16)  SpO2: --    General: A&Ox3, appropriate, NAD  Cardiac: Regular rate and rhythm  Resp: CTAB  Abd: Gravid, soft, nontender  SSE: no pooling, nitrazine equivcol, ferning positive  SVE: closed, long, posterior  EFM: 135, mod variability, +accel, no decel  [place]: irregular  TAS: vtx T(C): 36.8 (06-20-21 @ 16:26), Max: 36.8 (06-20-21 @ 16:26)  HR: 100 (06-20-21 @ 16:34) (100 - 100)  BP: 110/74 (06-20-21 @ 16:34) (110/74 - 110/74)  RR: 16 (06-20-21 @ 16:26) (16 - 16)  SpO2: --    General: A&Ox3, appropriate, NAD  Cardiac: Regular rate and rhythm  Resp: CTAB  Abd: Gravid, soft, nontender  SSE: no pooling, nitrazine equivcol, ferning positive  SVE: closed, long, posterior  EFM: 135, mod variability, +accel, no decel  [place]: irregular q 6 min  TAS: vtx

## 2021-06-20 NOTE — OB RN PATIENT PROFILE - ALERT: PERTINENT HISTORY
20 Week Level II Sonogram/BioPhysical Profile(s)/Follow up Sonogram for Growth/Non Invasive Prenatal Screen (NIPS)/Fetal Non-Stress Test (NST)

## 2021-06-20 NOTE — OB PROVIDER H&P - ASSESSMENT
25y  at 39w with PROM at 1430  GBS negative    Admit to L&D  Routine labs  Continuous EFM/toco  Covid swabbed x 2  Consents obtained  IOL with PO cytotec    d/w Bart MATA

## 2021-06-20 NOTE — OB PROVIDER TRIAGE NOTE - NSHPPHYSICALEXAM_GEN_ALL_CORE
T(C): 36.8 (06-20-21 @ 16:26), Max: 36.8 (06-20-21 @ 16:26)  HR: 100 (06-20-21 @ 16:34) (100 - 100)  BP: 110/74 (06-20-21 @ 16:34) (110/74 - 110/74)  RR: 16 (06-20-21 @ 16:26) (16 - 16)  SpO2: --    General: A&Ox3, appropriate, NAD  Cardiac: Regular rate and rhythm  Resp: CTAB  Abd: Gravid, soft, nontender  SSE: no pooling, nitrazine equivcol, ferning positive  SVE: closed, long, posterior  EFM: 135, mod variability, +accel, no decel  [place]: irregular  TAS: vtx

## 2021-06-20 NOTE — OB PROVIDER H&P - NS_BABIESUTERO_OBGYN_ALL_OB_NU
1
assumed care of pt in bed 5. pt alert and oriented female c/o of chest "tightness". pt states last night she took cocaine and the tightness began this morning at 5;30 am. pt took aspirin 325 at home. Pt denies radiation of pain, sob, dizziness. LMP may 2

## 2021-06-20 NOTE — OB PROVIDER H&P - NSWIC_OBGYN_ALL_OB
Patient was seen in pre-op. I have reviewed the history and physical.  I have examined the patient today. There are no changes noted from the H & P available in chart.
No

## 2021-06-20 NOTE — OB PROVIDER TRIAGE NOTE - HISTORY OF PRESENT ILLNESS
PNC: Adam    This is a 24yo  at 39w (EDC 21) presents to triage with c/o of leakage of clear fluid since 14:30 this afternoon with continual leakage. Mild cramping appreciated, rating 2/10 in pain. Appreciates good fetal movement, denies vaginal bleeding.   Denies positive covid, covid exposures, or covid s/s.     AP complications:  Obesity

## 2021-06-20 NOTE — OB RN PATIENT PROFILE - NS_OBGYNHISTORY_OBGYN_ALL_OB_FT
top x 1 with D&C  SAB x 1 with D&C     GYN hx: +fibroid? Denies hx of ovarian cysts, abnl paps, or STI

## 2021-06-21 ENCOUNTER — APPOINTMENT (OUTPATIENT)
Dept: ANTEPARTUM | Facility: CLINIC | Age: 26
End: 2021-06-21

## 2021-06-21 LAB
COVID-19 SPIKE DOMAIN AB INTERP: NEGATIVE — SIGNIFICANT CHANGE UP
COVID-19 SPIKE DOMAIN ANTIBODY RESULT: 0.4 U/ML — SIGNIFICANT CHANGE UP
SARS-COV-2 IGG+IGM SERPL QL IA: 0.4 U/ML — SIGNIFICANT CHANGE UP
SARS-COV-2 IGG+IGM SERPL QL IA: NEGATIVE — SIGNIFICANT CHANGE UP
T PALLIDUM AB TITR SER: NEGATIVE — SIGNIFICANT CHANGE UP

## 2021-06-21 RX ORDER — DIPHENHYDRAMINE HCL 50 MG
25 CAPSULE ORAL EVERY 6 HOURS
Refills: 0 | Status: COMPLETED | OUTPATIENT
Start: 2021-06-21 | End: 2022-05-20

## 2021-06-21 RX ORDER — CITRIC ACID/SODIUM CITRATE 300-500 MG
30 SOLUTION, ORAL ORAL ONCE
Refills: 0 | Status: COMPLETED | OUTPATIENT
Start: 2021-06-21 | End: 2021-06-21

## 2021-06-21 RX ORDER — TETANUS TOXOID, REDUCED DIPHTHERIA TOXOID AND ACELLULAR PERTUSSIS VACCINE, ADSORBED 5; 2.5; 8; 8; 2.5 [IU]/.5ML; [IU]/.5ML; UG/.5ML; UG/.5ML; UG/.5ML
0.5 SUSPENSION INTRAMUSCULAR ONCE
Refills: 0 | Status: DISCONTINUED | OUTPATIENT
Start: 2021-06-21 | End: 2021-06-23

## 2021-06-21 RX ORDER — OXYCODONE HYDROCHLORIDE 5 MG/1
5 TABLET ORAL ONCE
Refills: 0 | Status: DISCONTINUED | OUTPATIENT
Start: 2021-06-21 | End: 2021-06-23

## 2021-06-21 RX ORDER — FERROUS SULFATE 325(65) MG
325 TABLET ORAL DAILY
Refills: 0 | Status: DISCONTINUED | OUTPATIENT
Start: 2021-06-21 | End: 2021-06-23

## 2021-06-21 RX ORDER — DIPHENHYDRAMINE HCL 50 MG
25 CAPSULE ORAL EVERY 6 HOURS
Refills: 0 | Status: DISCONTINUED | OUTPATIENT
Start: 2021-06-21 | End: 2021-06-23

## 2021-06-21 RX ORDER — SENNA PLUS 8.6 MG/1
1 TABLET ORAL
Refills: 0 | Status: DISCONTINUED | OUTPATIENT
Start: 2021-06-21 | End: 2021-06-23

## 2021-06-21 RX ORDER — MAGNESIUM HYDROXIDE 400 MG/1
30 TABLET, CHEWABLE ORAL
Refills: 0 | Status: DISCONTINUED | OUTPATIENT
Start: 2021-06-21 | End: 2021-06-23

## 2021-06-21 RX ORDER — OXYTOCIN 10 UNIT/ML
333.33 VIAL (ML) INJECTION
Qty: 20 | Refills: 0 | Status: DISCONTINUED | OUTPATIENT
Start: 2021-06-21 | End: 2021-06-21

## 2021-06-21 RX ORDER — FAMOTIDINE 10 MG/ML
20 INJECTION INTRAVENOUS ONCE
Refills: 0 | Status: COMPLETED | OUTPATIENT
Start: 2021-06-21 | End: 2021-06-21

## 2021-06-21 RX ORDER — SODIUM CHLORIDE 9 MG/ML
1000 INJECTION, SOLUTION INTRAVENOUS ONCE
Refills: 0 | Status: DISCONTINUED | OUTPATIENT
Start: 2021-06-21 | End: 2021-06-23

## 2021-06-21 RX ORDER — SODIUM CHLORIDE 9 MG/ML
1000 INJECTION, SOLUTION INTRAVENOUS
Refills: 0 | Status: DISCONTINUED | OUTPATIENT
Start: 2021-06-21 | End: 2021-06-21

## 2021-06-21 RX ORDER — LANOLIN
1 OINTMENT (GRAM) TOPICAL EVERY 6 HOURS
Refills: 0 | Status: DISCONTINUED | OUTPATIENT
Start: 2021-06-21 | End: 2021-06-23

## 2021-06-21 RX ORDER — OXYCODONE HYDROCHLORIDE 5 MG/1
5 TABLET ORAL
Refills: 0 | Status: DISCONTINUED | OUTPATIENT
Start: 2021-06-21 | End: 2021-06-23

## 2021-06-21 RX ORDER — IBUPROFEN 200 MG
600 TABLET ORAL EVERY 6 HOURS
Refills: 0 | Status: COMPLETED | OUTPATIENT
Start: 2021-06-21 | End: 2022-05-20

## 2021-06-21 RX ORDER — SODIUM CHLORIDE 9 MG/ML
1000 INJECTION INTRAMUSCULAR; INTRAVENOUS; SUBCUTANEOUS
Refills: 0 | Status: DISCONTINUED | OUTPATIENT
Start: 2021-06-21 | End: 2021-06-21

## 2021-06-21 RX ORDER — KETOROLAC TROMETHAMINE 30 MG/ML
30 SYRINGE (ML) INJECTION EVERY 6 HOURS
Refills: 0 | Status: DISCONTINUED | OUTPATIENT
Start: 2021-06-21 | End: 2021-06-22

## 2021-06-21 RX ORDER — OXYTOCIN 10 UNIT/ML
2 VIAL (ML) INJECTION
Qty: 30 | Refills: 0 | Status: DISCONTINUED | OUTPATIENT
Start: 2021-06-21 | End: 2021-06-21

## 2021-06-21 RX ORDER — ACETAMINOPHEN 500 MG
975 TABLET ORAL
Refills: 0 | Status: DISCONTINUED | OUTPATIENT
Start: 2021-06-21 | End: 2021-06-23

## 2021-06-21 RX ORDER — SIMETHICONE 80 MG/1
80 TABLET, CHEWABLE ORAL EVERY 4 HOURS
Refills: 0 | Status: DISCONTINUED | OUTPATIENT
Start: 2021-06-21 | End: 2021-06-23

## 2021-06-21 RX ORDER — HEPARIN SODIUM 5000 [USP'U]/ML
5000 INJECTION INTRAVENOUS; SUBCUTANEOUS EVERY 12 HOURS
Refills: 0 | Status: DISCONTINUED | OUTPATIENT
Start: 2021-06-21 | End: 2021-06-23

## 2021-06-21 RX ORDER — SODIUM CHLORIDE 9 MG/ML
500 INJECTION INTRAMUSCULAR; INTRAVENOUS; SUBCUTANEOUS ONCE
Refills: 0 | Status: DISCONTINUED | OUTPATIENT
Start: 2021-06-21 | End: 2021-06-21

## 2021-06-21 RX ORDER — SODIUM CHLORIDE 9 MG/ML
500 INJECTION, SOLUTION INTRAVENOUS ONCE
Refills: 0 | Status: COMPLETED | OUTPATIENT
Start: 2021-06-21 | End: 2021-06-21

## 2021-06-21 RX ADMIN — Medication 1000 MILLIUNIT(S)/MIN: at 15:29

## 2021-06-21 RX ADMIN — Medication 30 MILLIGRAM(S): at 19:33

## 2021-06-21 RX ADMIN — Medication 975 MILLIGRAM(S): at 23:39

## 2021-06-21 RX ADMIN — FAMOTIDINE 20 MILLIGRAM(S): 10 INJECTION INTRAVENOUS at 12:11

## 2021-06-21 RX ADMIN — SODIUM CHLORIDE 500 MILLILITER(S): 9 INJECTION, SOLUTION INTRAVENOUS at 08:30

## 2021-06-21 RX ADMIN — Medication 30 MILLILITER(S): at 12:12

## 2021-06-21 RX ADMIN — Medication 975 MILLIGRAM(S): at 23:09

## 2021-06-21 RX ADMIN — Medication 2 MILLIUNIT(S)/MIN: at 08:52

## 2021-06-21 RX ADMIN — HEPARIN SODIUM 5000 UNIT(S): 5000 INJECTION INTRAVENOUS; SUBCUTANEOUS at 19:33

## 2021-06-21 RX ADMIN — Medication 30 MILLIGRAM(S): at 19:48

## 2021-06-21 RX ADMIN — SODIUM CHLORIDE 75 MILLILITER(S): 9 INJECTION, SOLUTION INTRAVENOUS at 15:29

## 2021-06-21 RX ADMIN — Medication 25 MILLIGRAM(S): at 19:33

## 2021-06-21 RX ADMIN — SODIUM CHLORIDE 125 MILLILITER(S): 9 INJECTION INTRAMUSCULAR; INTRAVENOUS; SUBCUTANEOUS at 08:46

## 2021-06-21 NOTE — OB RN DELIVERY SUMMARY - NS_SEPSISRSKCALC_OBGYN_ALL_OB_FT
Rupture of membranes must be entered above.   EOS calculated successfully. EOS Risk Factor: 0.5/1000 live births ([facility] national incidence); GA=39w1d; Temp=98.24; ROM=22.7; GBS='Negative'; Antibiotics='No antibiotics or any antibiotics < 2 hrs prior to birth'

## 2021-06-21 NOTE — OB NEONATOLOGY/PEDIATRICIAN DELIVERY SUMMARY - NSPEDSNEONOTESA_OBGYN_ALL_OB_FT
Called to OR by the OB for a category II fetal heart tracing of a 39w 1d female. Called to OR by the OB for a category II fetal heart tracing of this 39w 1d female infant. Mom was a 28 yo A+ , HIV neg, Hep B and RPR non-reactive, Rubella non-immune. Mother was GBS negative on 6/3 and COVID negative on . SROM on  @ 14:30. Infant was deliver through  incision with tight nuchal which was reduced. Infant had spontaneous cry. W/D/S/S and bulb suctioned mouth and nose. APGAR was 9/9 @ 1 and 5 respectively. EOS is 0.14. Mom plans to breast feed and to administer Hep B vaccine. Transferred to well baby nursery. Called to OR by the OB for a category II fetal heart tracing of this 39w 1d female infant. Mom was a 28 yo A+ , HIV neg, Hep B and RPR non-reactive, Rubella non-immune. Mother was GBS negative on 6/3 and COVID negative on . SROM on  @ 14:30. Infant was deliver through  incision with tight nuchal which was reduced. Infant had spontaneous cry. W/D/S/S and bulb suctioned mouth and nose. APGAR was 9/9 @ 1 and 5 respectively. EOS is 0.14. Mom plans to breast feed and to administer Hep B vaccine. Transferred to well baby nursery.  I attended delivery d/t Cat II tracing. Infant was born with spontaneous cry and required routine care- RAMIRO Encinas-BC.

## 2021-06-21 NOTE — OB PROVIDER LABOR PROGRESS NOTE - NS_SUBJECTIVE/OBJECTIVE_OBGYN_ALL_OB_FT
At bedside for VE as uncomfortable with ctx
At bedside for VE as patient still uncomfortable with contractions
At bedside for VE as requesting an epi
Pt seen for increased pressure

## 2021-06-21 NOTE — OB RN DELIVERY SUMMARY - NSSELHIDDEN_OBGYN_ALL_OB_FT
[NS_DeliveryAttending1_OBGYN_ALL_OB_FT:[email or web address]=],[NS_DeliveryAssist1_OBGYN_ALL_OB_FT:[id number]=],[NS_DeliveryRN_OBGYN_ALL_OB_FT:[id number]]

## 2021-06-21 NOTE — OB PROVIDER DELIVERY SUMMARY - NSPROVIDERDELIVERYNOTE_OBGYN_ALL_OB_FT
Procedure: pLTCS  Preop Dx: nrFHT/arrest of dilatation  EBL: 800 ml   QBL: 441  IVF: 2L crystalloids  UOP: 250 ml  Complications: none  Specimen: none   Findings: uterus and ovaries grossly normal.   Baby: F OP position, tight nuchal cord x 1. Apgars 9/9,  g    Asia Mckeon PGY-1 Procedure: pLTCS  Preop Dx: nrFHT/arrest of dilatation  EBL: 800 ml   QBL: 441  IVF: 2L crystalloids  UOP: 250 ml  Complications: none  Specimen: none   Findings: uterus and ovaries grossly normal. subserous fibroid posterior right,around 4-5 cm  Baby: F OP position, tight nuchal cord x 1. Apgars 9/9,  g    Asia Mckeon PGY-1

## 2021-06-21 NOTE — PROGRESS NOTE ADULT - SUBJECTIVE AND OBJECTIVE BOX
patient seen and evaluated  was admitted last night due to prom  s/p cytotec,pitocin was not started due to tracing  fhr cat 2/,now recover with  good variability  started ise and iupc with amnioinfusion due to late decelerations  sve as per NP  a/p 26 y/o p0 at 39 .1 wks   prom   we will augment with pitocin as tracing tolerate  continue to monitor fhr and toco    anticipate  covering attending  patient seen and evaluated this am  was admitted last night due to prom  s/p cytotec,pitocin was not started due to tracing  fhr cat 2/,now recovered with  good variability  started ise and iupc with amnioinfusion due to late decelerations  sve as per NP  a/p 26 y/o p0 at 39 .1 wks   prom   we will augment with pitocin as tracing tolerate  continue to monitor fhr and toco    anticipate

## 2021-06-21 NOTE — OB RN PREOPERATIVE CHECKLIST - ALLERGIES REVIEWED
[facility]) Physicians   [employer]    Zuleima Diamond is a 34 y.o. female evaluated via telephone on 3/30/2020. Consent:  She and/or health care decision maker is aware that that she may receive a bill for this telephone service, depending on her insurance coverage, and has provided verbal consent to proceed: Yes    Documentation:  I communicated with the patient and/or health care decision maker about see below. Details of this discussion including any medical advice provided: see below    I affirm this is a Patient Initiated Episode with an Established Patient who has not had a related appointment within my department in the past 7 days or scheduled within the next 24 hours. Total Time: minutes: 11-20 minutes    Note: not billable if this call serves to triage the patient into an appointment for the relevant concern    Celine Saldivar     Subjective:      Patient ID: Zuleima Diamond is a 34 y.o. female has requested an audio/video evaluation. HPI    Due to the COVID-19 restrictions on close contact interactions the patient's monthly presurgical visit was conducted via telephone in man of a face to face visit. Patient has consented to have this visit conducted via telephone and I am conducting it from the office. The patient is here through telemedicine for their bariatric surgery presurgical visit for future weight loss. She has made several attempts at weight loss in the past without success and now wishes to pursue bariatric surgery. She is working to change her dietary behaviors and lose weight to improve comorbid conditions such as hypertension, diabetes mellitus, hyperlipidemia, obstructive sleep apnea and GERD. Zuleima Diamond is a 34 y.o. female with Body mass index is 52.13 kg/m².     Past Medical History:   Diagnosis Date    Chronic GERD 1/30/2020    Diabetes mellitus type 2 in obese [facility]) 1/30/2020    Essential hypertension 1/30/2020    Morbid obesity with BMI of 50. 0-59.9, adult ([facility] [address] 1/30/2020    Obstructive sleep apnea 1/30/2020     No past surgical history on file. Family History   Problem Relation Age of Onset    Hypertension Mother     Stroke Mother     Elevated Lipids Mother     Mental Illness Mother     Hypertension Father     Stroke Father     Elevated Lipids Father     Diabetes Father     Mental Illness Father      Social History     Tobacco Use    Smoking status: Never Smoker    Smokeless tobacco: Never Used   Substance Use Topics    Alcohol use: Not Currently     I counseled the patient on the importance of not smoking and risks of ETOH. No Known Allergies  Vitals:    03/30/20 1623   Weight: 285 lb (129.3 kg)   Height: 5' 2\" (1.575 m)     Body mass index is 52.13 kg/m².     Current Outpatient Medications:     metFORMIN (GLUCOPHAGE) 1000 MG tablet, , Disp: , Rfl:     pioglitazone (ACTOS) 30 MG tablet, Take 30 mg by mouth, Disp: , Rfl:     sertraline (ZOLOFT) 100 MG tablet, Take 100 mg by mouth, Disp: , Rfl:     levothyroxine (SYNTHROID) 175 MCG tablet, Take 175 mcg by mouth, Disp: , Rfl:     atorvastatin (LIPITOR) 20 MG tablet, , Disp: , Rfl:     valACYclovir (VALTREX) 500 MG tablet, Take 500 mg by mouth, Disp: , Rfl:     fexofenadine (ALLEGRA) 180 MG tablet, Take 180 mg by mouth, Disp: , Rfl:     Lab Results   Component Value Date    WBC 6.3 01/31/2020    RBC 4.30 01/31/2020    HGB 13.1 01/31/2020    HCT 38.9 01/31/2020    MCV 90.5 01/31/2020    MCH 30.5 01/31/2020    MCHC 33.7 01/31/2020    MPV 8.4 01/31/2020    NEUTOPHILPCT 64.6 01/31/2020    LYMPHOPCT 25.8 01/31/2020    MONOPCT 5.6 01/31/2020    EOSRELPCT 3.3 01/31/2020    BASOPCT 0.7 01/31/2020    NEUTROABS 4.1 01/31/2020    LYMPHSABS 1.6 01/31/2020    MONOSABS 0.4 01/31/2020    EOSABS 0.2 01/31/2020     Lab Results   Component Value Date     01/31/2020    K 4.0 01/31/2020    CL 98 01/31/2020    CO2 27 01/31/2020    ANIONGAP 12 01/31/2020    GLUCOSE 135 01/31/2020    BUN 11 done 01/31/2020    CREATININE <0.5 01/31/2020    LABGLOM >60 01/31/2020    GFRAA >60 01/31/2020    CALCIUM 8.9 01/31/2020    PROT 6.8 01/31/2020    LABALBU 3.9 01/31/2020    AGRATIO 1.3 01/31/2020    BILITOT 0.6 01/31/2020    ALKPHOS 90 01/31/2020    ALT 17 01/31/2020    AST 16 01/31/2020    GLOB 2.9 01/31/2020     Lab Results   Component Value Date    CHOL 148 01/31/2020    TRIG 130 01/31/2020    HDL 43 01/31/2020    LDLCALC 79 01/31/2020    LABVLDL 26 01/31/2020     Lab Results   Component Value Date    TSHREFLEX 17.12 01/31/2020     Lab Results   Component Value Date    IRON 102 01/31/2020    TIBC 319 01/31/2020    LABIRON 32 01/31/2020     Lab Results   Component Value Date    LITKHUEZ45 219 01/31/2020    FOLATE 11.16 01/31/2020     Lab Results   Component Value Date    VITD25 50.2 01/31/2020     Lab Results   Component Value Date    LABA1C 7.3 01/31/2020    .8 01/31/2020     Review of Systems   Constitutional: Negative. Negative for chills and fever. HENT: Negative. Eyes: Negative. Respiratory: Negative. Negative for cough and shortness of breath. Cardiovascular: Negative. Gastrointestinal: Negative. Endocrine: Negative. Genitourinary: Negative. Musculoskeletal: Negative. Skin: Negative. Allergic/Immunologic: Negative. Neurological: Negative. Hematological: Negative. Psychiatric/Behavioral: Negative. Assessment and Plan:   Patient is here for their 2nd presurgery visit for sleeve via telemedicine, up 3.4 lbs. The patient's current Body mass index is 52.13 kg/m². (3/30/20). She is making dietary and behavior modifications. Needs to focus on protein with all her meals/snacks, eliminate soda and caffeine. She talked with the registered dietitian for continued follow up. I agree with recommendations and plan. She is exercising with walking. Encouraged continued physical activity.  Patient received instruction that it is recommended to avoid pregnancy following

## 2021-06-21 NOTE — PROGRESS NOTE ADULT - SUBJECTIVE AND OBJECTIVE BOX
patient seen and evaluated  feels uncomfortable  fhr cat 2 with amnioinfusion  now(she had another variable decel for 2 min  recover with  o2 and positional change  sve 4/80/-1 /vtx  spoke with patient about fetal status and variable deceleration if persistent and recurrent need to expedited delivery  patient first said does not want csection then she said in case of emergency will do it and signed consent  all question answered  continue to monitor fhr  we will reevaluate in 1 hr or sooner if needed

## 2021-06-21 NOTE — OB PROVIDER DELIVERY SUMMARY - NSSELHIDDEN_OBGYN_ALL_OB_FT
[NS_DeliveryAttending1_OBGYN_ALL_OB_FT:[email or web address]=],[NS_DeliveryAssist1_OBGYN_ALL_OB_FT:[id number]=],[NS_DeliveryRN_OBGYN_ALL_OB_FT:[id number]]

## 2021-06-21 NOTE — BRIEF OPERATIVE NOTE - NSICDXBRIEFPOSTOP_GEN_ALL_CORE_FT
POST-OP DIAGNOSIS:  Abnormal fetal heart rate affecting pregnancy 21-Jun-2021 14:29:51  Asia Mckeon

## 2021-06-21 NOTE — OB PROVIDER LABOR PROGRESS NOTE - ASSESSMENT
IOL for PROM. Patient making adequate cervical change.    Will call for epidural and re-examine in 1 hr    D/w Dr. Bart Breen PGY-4
PROM IOL requesting pain medication is elier on her own without cyctotec yet.  -IV morphine for pain  -re-examine prn    D/w Dr. Bart Breen PGY-4
26 y/o  @39+1w PROM IOL sp PO unable to start pitocin due to tracing    ISE/IUPC placed for closer monitoring  Amnioinfusion initiated  IV/O2/Repositioning  Will continue to monitor closely  For pitocin once tracing improves   DW Dr. Swetha vazquez, NP
PROM IOL making cervical change with cytotec.    Patient declining epidural at this time. Morphine was not helpful she would like to just breathe through ctx and use massage.    Plan per Dr. Bart Breen PGY-4

## 2021-06-21 NOTE — PROGRESS NOTE ADULT - SUBJECTIVE AND OBJECTIVE BOX
patient seen and evaluated  still persistent cat 2 tracing  sve no change  due to persistent cat 2 tracing with recurrent late decelerations we will proceed to OR  staff and anesthesia notified

## 2021-06-21 NOTE — OB RN INTRAOPERATIVE NOTE - NSSELHIDDEN_OBGYN_ALL_OB_FT
[NS_DeliveryAttending1_OBGYN_ALL_OB_FT:[email or web address]=],[NS_DeliveryAssist1_OBGYN_ALL_OB_FT:[id number]=],[NS_DeliveryRN_OBGYN_ALL_OB_FT:[id number]]

## 2021-06-21 NOTE — OB PROVIDER LABOR PROGRESS NOTE - NS_OBIHIFHRDETAILS_OBGYN_ALL_OB_FT
130/mod inocencio/- accels/late decels
130, mod inocencio, +accels no decels
130, mod inocencio, +accels, no decels
125, mod inocencio, +accels, no decels

## 2021-06-21 NOTE — OB RN PREOPERATIVE CHECKLIST - NS_PREOPBLOODCONS_OBGYN_ALL_OB
Anesthesia Volume In Cc: 0.5 Detail Level: Detailed Add 52 Modifier (Optional): no Medical Necessity Clause: This procedure was medically necessary because the lesions that were treated were: Post-Care Instructions: I reviewed with the patient in detail post-care instructions. Patient is to wear sunprotection, and avoid picking at any of the treated lesions. Pt may apply Vaseline to crusted or scabbing areas. Medical Necessity Information: It is in your best interest to select a reason for this procedure from the list below. All of these items fulfill various CMS LCD requirements except the new and changing color options. Consent: The patient's consent was obtained including but not limited to risks of crusting, scabbing, blistering, scarring, darker or lighter pigmentary change, recurrence, incomplete removal and infection. No Treatment Number (Will Not Render If 0): 0

## 2021-06-21 NOTE — BRIEF OPERATIVE NOTE - NSICDXBRIEFPREOP_GEN_ALL_CORE_FT
PRE-OP DIAGNOSIS:  Abnormal fetal heart rate affecting pregnancy 21-Jun-2021 14:29:43  Asia Mckeon

## 2021-06-22 ENCOUNTER — TRANSCRIPTION ENCOUNTER (OUTPATIENT)
Age: 26
End: 2021-06-22

## 2021-06-22 LAB
BASOPHILS # BLD AUTO: 0.03 K/UL — SIGNIFICANT CHANGE UP (ref 0–0.2)
BASOPHILS NFR BLD AUTO: 0.2 % — SIGNIFICANT CHANGE UP (ref 0–2)
EOSINOPHIL # BLD AUTO: 0.01 K/UL — SIGNIFICANT CHANGE UP (ref 0–0.5)
EOSINOPHIL NFR BLD AUTO: 0.1 % — SIGNIFICANT CHANGE UP (ref 0–6)
HCT VFR BLD CALC: 26.1 % — LOW (ref 34.5–45)
HGB BLD-MCNC: 8.3 G/DL — LOW (ref 11.5–15.5)
IANC: 13.9 K/UL — HIGH (ref 1.5–8.5)
IMM GRANULOCYTES NFR BLD AUTO: 0.8 % — SIGNIFICANT CHANGE UP (ref 0–1.5)
LYMPHOCYTES # BLD AUTO: 19.5 % — SIGNIFICANT CHANGE UP (ref 13–44)
LYMPHOCYTES # BLD AUTO: 3.82 K/UL — HIGH (ref 1–3.3)
MCHC RBC-ENTMCNC: 26.9 PG — LOW (ref 27–34)
MCHC RBC-ENTMCNC: 31.8 GM/DL — LOW (ref 32–36)
MCV RBC AUTO: 84.7 FL — SIGNIFICANT CHANGE UP (ref 80–100)
MONOCYTES # BLD AUTO: 1.66 K/UL — HIGH (ref 0–0.9)
MONOCYTES NFR BLD AUTO: 8.5 % — SIGNIFICANT CHANGE UP (ref 2–14)
NEUTROPHILS # BLD AUTO: 13.9 K/UL — HIGH (ref 1.8–7.4)
NEUTROPHILS NFR BLD AUTO: 70.9 % — SIGNIFICANT CHANGE UP (ref 43–77)
NRBC # BLD: 0 /100 WBCS — SIGNIFICANT CHANGE UP
NRBC # FLD: 0 K/UL — SIGNIFICANT CHANGE UP
PLATELET # BLD AUTO: 349 K/UL — SIGNIFICANT CHANGE UP (ref 150–400)
RBC # BLD: 3.08 M/UL — LOW (ref 3.8–5.2)
RBC # FLD: 15.9 % — HIGH (ref 10.3–14.5)
WBC # BLD: 19.57 K/UL — HIGH (ref 3.8–10.5)
WBC # FLD AUTO: 19.57 K/UL — HIGH (ref 3.8–10.5)

## 2021-06-22 RX ORDER — FERROUS SULFATE 325(65) MG
1 TABLET ORAL
Qty: 30 | Refills: 0
Start: 2021-06-22 | End: 2021-07-21

## 2021-06-22 RX ORDER — IBUPROFEN 200 MG
1 TABLET ORAL
Qty: 28 | Refills: 1
Start: 2021-06-22 | End: 2021-07-05

## 2021-06-22 RX ORDER — IBUPROFEN 200 MG
600 TABLET ORAL EVERY 6 HOURS
Refills: 0 | Status: DISCONTINUED | OUTPATIENT
Start: 2021-06-22 | End: 2021-06-23

## 2021-06-22 RX ORDER — ACETAMINOPHEN 500 MG
2 TABLET ORAL
Qty: 40 | Refills: 0
Start: 2021-06-22 | End: 2021-06-26

## 2021-06-22 RX ADMIN — Medication 30 MILLIGRAM(S): at 10:17

## 2021-06-22 RX ADMIN — Medication 975 MILLIGRAM(S): at 06:04

## 2021-06-22 RX ADMIN — Medication 975 MILLIGRAM(S): at 05:34

## 2021-06-22 RX ADMIN — Medication 600 MILLIGRAM(S): at 18:20

## 2021-06-22 RX ADMIN — Medication 600 MILLIGRAM(S): at 18:45

## 2021-06-22 RX ADMIN — Medication 30 MILLIGRAM(S): at 02:12

## 2021-06-22 RX ADMIN — Medication 30 MILLIGRAM(S): at 01:57

## 2021-06-22 RX ADMIN — HEPARIN SODIUM 5000 UNIT(S): 5000 INJECTION INTRAVENOUS; SUBCUTANEOUS at 23:09

## 2021-06-22 RX ADMIN — HEPARIN SODIUM 5000 UNIT(S): 5000 INJECTION INTRAVENOUS; SUBCUTANEOUS at 10:21

## 2021-06-22 RX ADMIN — Medication 30 MILLIGRAM(S): at 10:30

## 2021-06-22 NOTE — LACTATION INITIAL EVALUATION - INTERVENTION OUTCOME
Continued assessment and assistance needed at this time./verbalizes understanding/demonstrates understanding of teaching/good return demonstration/Lactation team to follow up

## 2021-06-22 NOTE — DISCHARGE NOTE OB - HOSPITAL COURSE
Patient presented to labor and delivery with premature rupture of membranes at term. Patient was induced with oral cytotec followed by pitocin. Primary cs was performed due to category II tracing remote from delivery. A live female infant was born Apgar 9-9, 3260g. The patient had uneventful hospital course and discharged on POD#2.

## 2021-06-22 NOTE — LACTATION INITIAL EVALUATION - ABORTIONS, OB PROFILE
PT Evaluation     Today's date: 2021  Patient name: Mandy Mendez  : 1953  MRN: [id number]  Referring provider: Mary Hendrickson PT  Dx:   Encounter Diagnosis     ICD-10-CM    1  Acute neck pain  M54 2                   Assessment  Assessment details: 70 y/o right hand dominant male with c/o right-sided neck pain that started about two weeks ago  He purchased a rower unit for home and started using it daily  He started with five minutes on day one and increased the time by one minute every day  He denies any red flag sx's or HA's  Also, he denies any N/T in his right UE and/or weakness in his UE  IE being performed as Direct Access     Impairments: abnormal muscle tone, abnormal or restricted ROM, lacks appropriate home exercise program, pain with function, poor posture  and poor body mechanics    Symptom irritability: moderateUnderstanding of Dx/Px/POC: good   Prognosis: good    Goals  ST - I with HEP  2 - static scap < 3 5 inches from spinous process  LT - FOTO > 67  2 - turn his head to look behind him while driving a car without pain or limitations  3 - perform yard work without limitation  4 - sit and read for > 25 minutes with < 1/10 neck pain  5 - return to rowing without issues    Plan  Plan details: DA period expires 21  Patient would benefit from: skilled physical therapy  Planned therapy interventions: manual therapy, activity modification, neuromuscular re-education, patient education, postural training, strengthening, stretching, therapeutic activities, therapeutic exercise and home exercise program  Frequency: 2x week  Duration in weeks: 5  Treatment plan discussed with: patient        Subjective Evaluation    History of Present Illness          Not a recurrent problem   Quality of life: good    Pain  At best pain ratin  At worst pain ratin  Quality: tight, pulling and discomfort  Relieving factors: rest and change in position  Aggravating factors: lifting and overhead activity    Social Support  Stairs in house: yes   Lives in: multiple-level home  Lives with: spouse    Hand dominance: right      Diagnostic Tests  No diagnostic tests performed  Treatments  No previous or current treatments  Patient Goals  Patient goals for therapy: decreased pain, independence with ADLs/IADLs, return to sport/leisure activities and increased motion          Objective     Concurrent Complaints  Negative for night pain, disturbed sleep, headaches, trouble swallowing, difficulty breathing, respiratory pain, visual change and history of trauma    Neurological Testing     Sensation   Cervical/Thoracic   Left   Intact: light touch    Right   Intact: light touch    Reflexes   Left   Biceps (C5/C6): normal (2+)  Brachioradialis (C6): normal (2+)    Right   Biceps (C5/C6): normal (2+)  Brachioradialis (C6): normal (2+)    Active Range of Motion   Cervical/Thoracic Spine       Cervical  Subcranial protraction:  with pain   Restriction level: minimal  Subcranial retraction:   Restriction level: moderate  Flexion:  WFL  Extension:  Restriction level: moderate    Strength/Myotome Testing   Cervical Spine     Left   Normal strength    Right   Normal strength    General Comments:      Cervical/Thoracic Comments  FOTO = 49    Static scap: b/l = 4 inches from spinous process    Soft tissue palpation: increased tissue texture density of the right UT, scalenes, subscapularis, pec minor      Neuro Exam:     Headaches   Patient reports headaches: No               Precautions: HTN    Manuals 04/26            Tissue deformation - subscapularis LMR            Tissue deformation - pec minor LMR            Tissue deformation - UT LMR                         Neuro Re-Ed                                                                                                        Ther Ex 04/26            Low trap retractions 5"x15            HEP LMR            Self release - johnson verbal Ther Activity                                       Gait Training                                       Modalities 1

## 2021-06-22 NOTE — PROGRESS NOTE ADULT - ASSESSMENT
A/P: 26yo POD#1 s/p pLTCS for arrest,  ml.  Patient is stable and doing well post-operatively.    - Continue regular diet  - Increase ambulation  - Continue motrin, tylenol, oxycodone PRN for pain control.    - f/u AM CBC    Vilma Franco, PGY-1

## 2021-06-22 NOTE — DISCHARGE NOTE OB - PLAN OF CARE
full recovery Pelvic rest x 6wks  Regular diet  No heavy lifting x 8wks improve anemia Iron supplement daily in addition to prenatal vitamins

## 2021-06-22 NOTE — DISCHARGE NOTE OB - CARE PLAN
Principal Discharge DX:	Premature rupture of membranes  Goal:	full recovery  Assessment and plan of treatment:	Pelvic rest x 6wks  Regular diet  No heavy lifting x 8wks  Secondary Diagnosis:	Anemia due to acute blood loss  Goal:	improve anemia  Assessment and plan of treatment:	Iron supplement daily in addition to prenatal vitamins

## 2021-06-22 NOTE — PROGRESS NOTE ADULT - SUBJECTIVE AND OBJECTIVE BOX
OB Progress Note:  Delivery, POD#1    S: 26yo POD#1 s/p LTCS . Pain well controlled, tolerating regular diet, not yet passing flatus, ambulating without difficulty, voiding spontaneously. Denies heavy vaginal bleeding, N/V, CP/SOB/lightheadedness/dizziness.     O:   Vital Signs Last 24 Hrs  T(C): 36.9 (2021 05:59), Max: 36.9 (2021 05:59)  T(F): 98.4 (2021 05:59), Max: 98.4 (2021 05:59)  HR: 100 (2021 05:59) (85 - 132)  BP: 101/64 (2021 05:59) (90/55 - 156/130)  BP(mean): 75 (2021 17:30) (66 - 136)  RR: 18 (2021 05:59) (16 - 26)  SpO2: 100% (2021 05:59) (90% - 100%)    Labs:  Blood type: A Positive  Rubella IgG: RPR: Negative                          11.7   13.79<H> >-----------< 438<H>    ( 06-20 @ 18:47 )             38.0                  PE:  General: NAD  Abdomen: Mildly distended, appropriately tender, Fundus firm,   Incision: c/d/i  VE: No heavy vaginal bleeding

## 2021-06-22 NOTE — LACTATION INITIAL EVALUATION - LACTATION INTERVENTIONS
Primary RN made aware of consult and plan./initiate/review safe skin-to-skin/initiate/review hand expression/initiate/review pumping guidelines and safe milk handling/reverse pressure softening/initiate/review techniques for position and latch/post discharge community resources provided/initiate/review supplementation plan due to medical indications/initiate/review nipple shield use/review techniques to increase milk supply/initiate/review breast massage/compression/reviewed components of an effective feeding and at least 8 effective feedings per day required/reviewed importance of monitoring infant diapers, the breastfeeding log, and minimum output each day/reviewed feeding on demand/by cue at least 8 times a day/reviewed indications of inadequate milk transfer that would require supplementation

## 2021-06-22 NOTE — CHART NOTE - NSCHARTNOTEFT_GEN_A_CORE
Attending Note    Patient evaluated at bedside. Denies any acute events overnight. Reports breast engorgement. Tolerating regular diet, ambulating, no dysuria. Lochia light. Breast feeding.   VS t 97.8  P 109 R 18 /70     heent nl  abd soft, obese, fundus firm, Incision C/D/I  Ext 2+ b/l pedal edema,no erythema  labs: 8.3/26.1      A: POD#1 s/p Primary cs for category II tracing, anemia, clinically stable  P: Encourage ambulation     Iron/VitC for anemia      Advised patient to express/feed every 2-3hrs to prevent mastitis     Routine postop care     DC planning POD#2 if patient remains clinically stable     Mbeauvil

## 2021-06-22 NOTE — DISCHARGE NOTE OB - MEDICATION SUMMARY - MEDICATIONS TO TAKE
I will START or STAY ON the medications listed below when I get home from the hospital:     mg oral tablet  -- 1 tab(s) by mouth every 6 hours -for severe pain   -- Do not take this drug if you are pregnant.  It is very important that you take or use this exactly as directed.  Do not skip doses or discontinue unless directed by your doctor.  May cause drowsiness or dizziness.  Obtain medical advice before taking any non-prescription drugs as some may affect the action of this medication.  Take with food or milk.    -- Indication: For Full-term premature rupture of membranes, unspecified duration to onset of labor    Tylenol Extra Strength 500 mg oral tablet  -- 2 tab(s) by mouth every 6 hours -for moderate pain   -- This product contains acetaminophen.  Do not use  with any other product containing acetaminophen to prevent possible liver damage.    -- Indication: For Full-term premature rupture of membranes, unspecified duration to onset of labor    Slow Fe (as elemental iron) 45 mg oral tablet, extended release  -- 1 tab(s) by mouth once a day   -- Check with your doctor before becoming pregnant.  May discolor urine or feces.  Some non-prescription drugs may aggravate your condition.  Read all labels carefully.  If a warning appears, check with your doctor before taking.  Swallow whole.  Do not crush.    -- Indication: For Other pregnancy-related conditions, antepartum

## 2021-06-23 VITALS
DIASTOLIC BLOOD PRESSURE: 65 MMHG | SYSTOLIC BLOOD PRESSURE: 107 MMHG | OXYGEN SATURATION: 100 % | HEART RATE: 96 BPM | TEMPERATURE: 99 F | RESPIRATION RATE: 18 BRPM

## 2021-06-23 RX ADMIN — Medication 325 MILLIGRAM(S): at 11:52

## 2021-06-23 RX ADMIN — Medication 1 TABLET(S): at 11:52

## 2021-06-23 RX ADMIN — Medication 975 MILLIGRAM(S): at 09:07

## 2021-06-23 RX ADMIN — Medication 600 MILLIGRAM(S): at 11:52

## 2021-06-23 RX ADMIN — Medication 975 MILLIGRAM(S): at 03:36

## 2021-06-23 RX ADMIN — Medication 975 MILLIGRAM(S): at 09:37

## 2021-06-23 RX ADMIN — Medication 600 MILLIGRAM(S): at 12:29

## 2021-06-23 RX ADMIN — Medication 975 MILLIGRAM(S): at 04:25

## 2021-06-23 NOTE — PROGRESS NOTE ADULT - SUBJECTIVE AND OBJECTIVE BOX
Postpartum Note,  Section  She is a  25y woman who is now post-operative day: 2  Subjective:  The patient feels well.  She is ambulating.   She is tolerating regular diet.  She denies nausea and vomiting.  She is voiding.  Her pain is controlled.  She reports normal postpartum bleeding.    Physical exam:  Vital Signs Last 24 Hrs  T(C): 36.7 (2021 05:37), Max: 37.4 (2021 18:30)  T(F): 98.1 (2021 05:37), Max: 99.4 (2021 18:30)  HR: 109 (2021 05:37) (102 - 109)  BP: 109/61 (2021 05:37) (97/57 - 112/68)  BP(mean): --  RR: 18 (2021 05:37) (17 - 18)  SpO2: 98% (2021 05:37) (97% - 100%)  Gen: NAD      LABS:                        8.3    19.57 )-----------( 349      ( 2021 07:16 )             26.1       Allergies    penicillins (Angioedema)    Intolerances      MEDICATIONS  (STANDING):  acetaminophen   Tablet .. 975 milliGRAM(s) Oral <User Schedule>  diphtheria/tetanus/pertussis (acellular) Vaccine (ADAcel) 0.5 milliLiter(s) IntraMuscular once  ferrous    sulfate 325 milliGRAM(s) Oral daily  heparin   Injectable 5000 Unit(s) SubCutaneous every 12 hours  ibuprofen  Tablet. 600 milliGRAM(s) Oral every 6 hours  lactated ringers Bolus 1000 milliLiter(s) IV Bolus once  prenatal multivitamin 1 Tablet(s) Oral daily    MEDICATIONS  (PRN):  diphenhydrAMINE 25 milliGRAM(s) Oral every 6 hours PRN Pruritus  lanolin Ointment 1 Application(s) Topical every 6 hours PRN Sore Nipples  magnesium hydroxide Suspension 30 milliLiter(s) Oral two times a day PRN Constipation  oxyCODONE    IR 5 milliGRAM(s) Oral every 3 hours PRN Moderate to Severe Pain (4-10)  oxyCODONE    IR 5 milliGRAM(s) Oral once PRN Moderate to Severe Pain (4-10)  senna 1 Tablet(s) Oral two times a day PRN Constipation  simethicone 80 milliGRAM(s) Chew every 4 hours PRN Gas      Assessment and Plan  POD #2 s/p  section  Doing well.  Encourage ambulation  DC HOME

## 2021-07-20 NOTE — ED ADULT NURSE NOTE - FINAL NURSING ELECTRONIC SIGNATURE
Last Mammogram diagnostic was done 07/14/2021 for left breast.  Last Mammogram screening bilateral was done 01/07/2021.  Pt requesting for \"screening\".  Please advise. Thank you.     31-Jan-2018 07:49

## 2021-07-28 DIAGNOSIS — O99.213 OBESITY COMPLICATING PREGNANCY, THIRD TRIMESTER: ICD-10-CM

## 2021-07-28 DIAGNOSIS — Z3A.38 38 WEEKS GESTATION OF PREGNANCY: ICD-10-CM

## 2021-10-25 NOTE — OB RN TRIAGE NOTE - SUICIDE SCREENING QUESTION 1
d/w Dr Stubbs admit for Oligo PROM Cat 2 tracing @ 37.2 wks  for PO Cytotec  for Ampicillin for +GBS  prenatals reviewed  covid swab sent  2 units PRBC on hold
No

## 2021-12-10 NOTE — ED PROVIDER NOTE - CROS ED CONS ALL NEG
12/10/2021         RE: Adan Oliver  [address]  [place] [address]        Dear Colleague,    Thank you for referring your patient, Adan Oliver, to the [facility]. Please see a copy of my visit note below.    Adan Oliver  :  1961  DOS: 2021  MRN: [id number]      Sports Medicine Clinic Visit    PCP: Charly Castillo    Adan Oliver is a 59 year old male who is seen as a self referral presenting with right foot pain and right shoulder.    Injury:  1) Toe: He reports pain in the right great toe and medial foot for 2 weeks. His pain increases with walking and standing. He has used OTC arch supports but this has not changed the pain.     Social History: grocery shopping for elderly.    Interim History - 2021  Since last visit on 20 patient has begun to have pain again in his right great toe .  Right foot 1st MTP joint injection completed on 20 provided good relief for 6 months.  No new injury in the interim.  He would be interested in a repeat injection today.      Interim History - December 10, 2021  Since last visit on 10/29/2021 patient has begun to have pain again in his right great toe.  Injection done on 21, provided relief from about 6 months.   No interim injury.           Review of Systems  Skin: no bruising, no swelling  Musculoskeletal: as above  Neurologic: no numbness, paresthesias  Remainder of review of systems is negative including constitutional, CV, pulmonary, GI, except as noted in HPI or medical history.    Patient's current problem list, past medical and surgical history, and family history were reviewed.    Patient Active Problem List   Diagnosis     Esophageal reflux     Hyperlipidemia with target LDL less than 130     Reid esophagus     Memory changes     Insomnia, psychophysiological     Family history of Alzheimer's disease     Anxiety     Lumbar degenerative disc disease      Protrusion of lumbar intervertebral disc     Lumbar spinal stenosis     Alcohol use     LUCIAN (obstructive sleep apnea)- 'moderate' (AHI 6)     Hip pain, bilateral     Past Medical History:   Diagnosis Date     Anxiety 12/07/2015     Reid esophagus 10/30/2014     Esophageal reflux 10/01/2014     History of shingles 2012     Hyperlipidemia with target LDL less than 130 10/01/2014     Lumbar degenerative disc disease 05/17/2016     Nephrolithiasis 2009     LUCIAN (obstructive sleep apnea)- 'moderate' (AHI 6) 10/06/2017    Study Date: 10/2/2017- (200.0 lbs) Scored using 3% rules. It was difficult to discern between PLMS and hyponeas. Apnea/hypopnea index was 28.0 events per hour.  The REM AHI was 29.3 events per hour.  The supine AHI was 32.7 events per hour.  RDI was 28.0 events per hour. Lowest oxygen saturation was 84.0%.  Time spent less than or equal to 88% was 0.3 minutes. PLM index was 32.3 movements per hour     Past Surgical History:   Procedure Laterality Date     APPENDECTOMY  2000s     BLEPHAROPLASTY BILATERAL Bilateral 10/3/2018    Procedure: BLEPHAROPLASTY BILATERAL;;  Surgeon: Dany Berrios MD;  Location: [facility]     CARPAL TUNNEL RELEASE RT/LT  1980s     COMBINED ESOPHAGOSCOPY, GASTROSCOPY, DUODENOSCOPY (EGD) WITH CO2 INSUFFLATION N/A 9/20/2019    Procedure: ESOPHAGOGASTRODUODENOSCOPY, WITH CO2 INSUFFLATION;  Surgeon: Filiberto Banuelos MD;  Location: [facility] OR     ESOPHAGOSCOPY, GASTROSCOPY, DUODENOSCOPY (EGD), COMBINED N/A 9/20/2019    Procedure: Esophagogastroduodenoscopy, With Biopsy;  Surgeon: Filiberto Banuelos MD;  Location: [facility]     REPAIR PTOSIS BILATERAL Bilateral 10/3/2018    Procedure: REPAIR PTOSIS BILATERAL;;  Surgeon: Dany Berrios MD;  Location: [facility]     REPAIR PTOSIS BROW BILATERAL Bilateral 10/3/2018    Procedure: REPAIR PTOSIS BROW BILATERAL;;  Surgeon: Dany Berrios MD;  Location: [facility]     SIGMOIDOSCOPY FLEXIBLE N/A 9/20/2019    Procedure: SIGMOIDOSCOPY,  "FLEXIBLE;  Surgeon: Filiberto Banuelos MD;  Location: [facility]     TONSILLECTOMY  1980s     Family History   Problem Relation Age of Onset     Hypertension Mother      Alzheimer Disease Mother      Diabetes Brother      Glaucoma No family hx of      Macular Degeneration No family hx of      Coronary Artery Disease No family hx of      Colon Cancer No family hx of      Retinal detachment No family hx of          Objective  /87   Ht 1.702 m (5' 7\")   Wt 79.4 kg (175 lb)   BMI 27.41 kg/m      GENERAL APPEARANCE: healthy, alert and no distress   GAIT: NORMAL  SKIN: no suspicious lesions or rashes  HEENT: Sclera clear, anicteric  CV: good peripheral pulses  RESP: Breathing not labored  NEURO: Normal strength and tone, mentation intact and speech normal  PSYCH:  mentation appears normal and affect normal/bright    Right Foot and Ankle Exam:  Inspection:       no visible ecchymosis       no visible edema or effusion    Foot inspection:       no deformity noted    Tender:       Great toe MTP joint     Non-Tender:       remainder of ankle and foot right    ROM:        Full active and passive ROM, ankle dorsiflexion, plantarflexion, inversion, eversion, great toe dorsiflexion, remainder of toes, midfoot and subtalar right  - some pain with great toe dorsiflexion and plantarflexion    Strength:       ankle dorsiflexion 5/5 right       plantarflexion 5/5 right       inversion 5/5 right       eversion 5/5 right       great toe dorsiflexion 5/5 right       great toe plantarflexion 5/5 right    Gait:       normal    Neurovascular:       2+ peripheral pulses bilaterally and brisk capillary refill       sensation grossly intact    Radiology  I ordered, visualized and reviewed these images with the patient  Xr Foot Right G/e 3 Views  Result Date: 9/28/2020  Examination:  XR FOOT RT G/E 3 VW Date:  9/28/2020 1:53 PM Clinical Information: Acute foot pain, right Additional Information: none Comparison: none   Impression: " Right foot negative for fracture, erosion, or bone lesion. Moderate joint space narrowing at the first MTP joint with moderate hypertrophic spurring, particularly on the dorsal aspect of the metatarsal head. Normal joint alignment is maintained. Normal joint alignment and spacing in the right foot elsewhere. No significant soft tissue abnormality in the right foot. HAMILTON MARCANO MD    Small Joint Injection/Arthrocentesis: R great MTP    Date/Time: 12/10/2021 1:38 PM  Performed by: Danny Oleary DO  Authorized by: Danny Oleary DO   Indications:  Pain  Needle Size:  25 G  Guidance: ultrasound     Approach:  Dorsal  Location:  Great toe    Site:  R great MTP                    Medications:  40 mg triamcinolone 40 MG/ML; 1 mL lidocaine 1 %        Outcome:  Tolerated well, no immediate complications  Procedure discussed: discussed risks, benefits, and alternatives    Consent Given by:  Patient  Timeout: timeout called immediately prior to procedure    Prep: patient was prepped and draped in usual sterile fashion            Assessment:  1. 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 Great toe pain, right          Plan:  - Today's Plan of Care:  Reviewed goals/roles for inserts or Dynaflex  Repeat US guided injection to 1st MTP joint today  Reviewed low impact activity strategies and footwear options  XR images independently visualized and reviewed with patient today in clinic  Expectations and goals of CSI reviewed  Often 2-3 days for steroid effect, and can take up to two weeks for maximum effect  We discussed modified progressive pain-free activity as tolerated  Do not overuse in first two weeks if feeling better due to concern for vulnerability while steroid is working  Supportive care reviewed  All questions were answered today  Contact us with additional questions or concerns  Signs and sx of concern reviewed      Danny Oleary DO, CAQ  Sports Medicine Physician  [facility]  [facility]          Again, thank you for allowing me to participate in the care of your patient.        Sincerely,        Danny Oleary, DO     negative...

## 2022-01-04 ENCOUNTER — EMERGENCY (EMERGENCY)
Facility: HOSPITAL | Age: 27
LOS: 1 days | Discharge: HOME | End: 2022-01-04
Admitting: STUDENT IN AN ORGANIZED HEALTH CARE EDUCATION/TRAINING PROGRAM
Payer: MEDICAID

## 2022-01-04 VITALS
SYSTOLIC BLOOD PRESSURE: 110 MMHG | HEART RATE: 76 BPM | OXYGEN SATURATION: 100 % | TEMPERATURE: 99 F | DIASTOLIC BLOOD PRESSURE: 80 MMHG | RESPIRATION RATE: 16 BRPM

## 2022-01-04 VITALS
HEART RATE: 73 BPM | HEIGHT: 62 IN | OXYGEN SATURATION: 100 % | TEMPERATURE: 99 F | SYSTOLIC BLOOD PRESSURE: 106 MMHG | RESPIRATION RATE: 16 BRPM | DIASTOLIC BLOOD PRESSURE: 61 MMHG

## 2022-01-04 DIAGNOSIS — Z90.89 ACQUIRED ABSENCE OF OTHER ORGANS: Chronic | ICD-10-CM

## 2022-01-04 LAB — SARS-COV-2 RNA SPEC QL NAA+PROBE: DETECTED

## 2022-01-04 PROCEDURE — 99284 EMERGENCY DEPT VISIT MOD MDM: CPT

## 2022-01-04 NOTE — ED PROVIDER NOTE - NS_EDPROVIDERDISPOUSERTYPE_ED_A_ED
I have personally evaluated and examined the patient. The Attending was available to me as a supervising provider if needed.
English

## 2022-01-04 NOTE — ED ADULT NURSE NOTE - OBJECTIVE STATEMENT
A&Ox4. ambulatory. c/o loss of taste and fever for one hour. NAD. pt denies chest pain, SOB, dizziness, n/v/d, urinary symptoms. respirations are even and un labored. lab drawn and sent. safety precautions maintained.

## 2022-01-04 NOTE — ED ADULT NURSE NOTE - NSFALLRSKASSESSTYPE_ED_ALL_ED
Problem: Physical Therapy Goal  Goal: Physical Therapy Goal  Description  Goals to be met by: 2020     Patient will increase functional independence with mobility by performin. Supine to sit with supervision.   2. Sit to supine with supervision.   3. Sit<>stand transfer with supervision using rolling walker.   4. Gait > 150 feet with SBA using rolling walker.   5. Ascend/descend 4 stairs with B handrails with CGA     2020 1111 by Nini Law PTA  Outcome: Ongoing, Progressing  2020 1111 by Nini Law PTA  Reactivated   Pt sit to stand SBA, amb'd 180' with RW and SBA, asc/desc 8 steps with R rail and CGA/SBA. Pt with ~70* seated knee flexion today, pain 8/10. HHPT   Initial (On Arrival)

## 2022-01-04 NOTE — ED ADULT TRIAGE NOTE - CHIEF COMPLAINT QUOTE
states" I am having fever since an hour and loss of taste ". took Motrin an hour ago for temp of 101.

## 2022-01-04 NOTE — ED PROVIDER NOTE - PATIENT PORTAL LINK FT
You can access the FollowMyHealth Patient Portal offered by [facility] by registering at the following website: [email or web address]. By joining [employer]’s FollowMyHealth portal, you will also be able to view your health information using other applications (apps) compatible with our system.

## 2022-01-04 NOTE — ED PROVIDER NOTE - NSFOLLOWUPINSTRUCTIONS_ED_ALL_ED_FT
Follow up with your primary care doctor within 1 week  Drink plenty of fluids  Take Tylenol 650mg every 6 hours as needed for fever or pain  - You can also take Ibuprofen 600mg every 8 hours as needed for fever or pain, take with food  Return to the ER with any worsening or concerning symptoms, chest pain, shortness of breath, weakness or any other concerns.

## 2022-01-04 NOTE — ED PROVIDER NOTE - CLINICAL SUMMARY MEDICAL DECISION MAKING FREE TEXT BOX
26yF w/no stated pmhx presenting with dry cough, fever and loss of taste x today. Pt took ibuprofen prior to ED arrival. Pt is well appearing, afebrile, lungs clear to auscultation bilaterally. Given constellation of symptoms concern for viral illness/covid. Plan: covid swab

## 2022-01-04 NOTE — ED ADULT TRIAGE NOTE - RESPIRATORY RATE (BREATHS/MIN)
NICU Speech Therapy Treatment Note    Current Status:   Infant now 37w6d  Bed Type: open crib  Respiratory Support: RA  Alarms: none  Feeding: NG, PO and Bottle   Pump Time: 30 minutes   Schedule: Q3 hour   RN completed pain assessment during care time    CLINICAL RE-ASSESSMENT:  Oral Feeding Readiness:    Physiological stability: RR < 60 and Pump feedings tolerated < 45 minutes   State Stability: Awake/ alert   Interest or \"drive\" to suck: Own hands to mouth and Roots to pacifier/nipple    Clinical Assessment of Swallowing:    Oral phase:     Latching on: rooting with organized latch on nipple    Sucking: burst/pause pattern     Pharyngeal phase: gulping, loss of fluid and multiple swallows     Physiological stability: fatigue,    State regulation: fatigue   Interest or \"drive\" to suck:  no continued rooting with increased fatigue    Recommended Interventions:   position: sidelying  nipple type: Dr. Brown Ultra Preemie  pacing  rest breaks: deep breaths, and to maximize endurance  re-alerting: Yes  limit feeding time to: 30 minutes    Summary:   Infant now at 37w 6d continues to demonstrate the following key issues during nipple feeding: limited stamina.     Plan/ Recommendations:  Speech Therapy to continue to follow 3 times per week.     Education: Parents were not present. Will meet and provide teaching strategies as available.     Short term goals:Nipple feed with increased stamina/ endurance taking appropriate volume for weight gain consistently across feedings for at least 24 hours  Maintain organized and efficient suck during nipple feeding to support intake of necessary volume for weight gain across all feedings for at least a 72 hour period.    Discharge goals: Nipple feed without any adverse overt events  Nipple feed with increased stamina necessary for adequate intake for appropriate growth/weight gain for at least a 48 hour period  Parent(s) will demonstrate appropriate use of strategies/ techniques to  support nipple feeding (including positioning, bottle set up and use, pacing per cues)   16

## 2022-05-12 NOTE — ED ADULT TRIAGE NOTE - PRO INTERPRETER NEED 2
Case Request received to schedule:    Patient Name: Kinjal Moe   MRN: [id number]   Case#: [id number]   Surgeon(s) and Role:      * Rolando Walden MD - Primary   Date requested: * No dates entered *   [facility]   Procedure(s):   Laparoscopic, possible open colostomy creation (N/A)     Additional Instructions for the Case  Spoke with Jermaine. Please schedule patient to follow with Dr. Walden on 6/9/2022 on the [place]. Thank-you!  **UPDATE: patient is OOT on 6/9/2022, so rescheduled surgery to 5/20/2022  Multi-surgeon case:  no  Time in minutes:  180  Anesthesia: general  Prep: full prep with antibiotics   Pre-Op: PAC  Labs: yes  WOC: yes  Nutrition: yes  Special equipment: no  Special Instructions: please let me know the date when scheduled. I have to give urology a heads up    Schedule with Bonnie Saxena NP 2-3 weeks after surgery.  Schedule with Surgeon 3-4 weeks after Bonnie Saxena NP     Spoke with patient via phone, completed/ confirmed the following scheduling, then sent Surgery Packet to patient via [employer] including related scheduling information and prep instructions:    Your surgery is scheduled:    Date: Friday May 20, 2022  ________________________________    Time: 7:30 AM*  ________________________________    Please arrive at:  5:30 AM*  ______________________    Surgeon's Name: Dr. Rolando Walden  _______________________  Nurse Line (ENA Torrez): [phone]  Scheduling (Paloma): [phone]      Pre-Op Physical Fax Numbers:         [facility] Pre-Admissions  [facility] Fax:  [phone] / Phone:  [phone]        Your surgery is located at:      [facility] [address])      [place] [address]      [phone]      [email or web address]   *Times are tentative and may change. You can expect a call from the pre-admission nurses to confirm  arrival and surgery start times if the times should change.     Required: Yes, you will need a  18 years or older to drive you home from your procedure as well as stay with you for 24 hours after your procedure, if you are discharged the same day as your procedure.    Surgery: Laparoscopic, possible open colostomy creation    Upcoming / Related Appointments:     Pre-operative Nutrition appointment:  Video appointment with Yesi Orantes, RD: 5/9/2022 at 10:30 AM                                                                      VIDEO VISIT    May 17, 2022  9:15 AM  (Arrive by 9:00 AM)  PAC EVALUATION with Bell Watson PA-C  [facility] [place] ([facility])  [phone]    VIDEO VISIT   May 18, 2022 12:10 PM  Pre-procedure Covid with PH COVID LAB  [facility] ([facility] ) [phone]    [address] [place] [address]   May 18, 2022 12:30 PM  LAB with PH LAB  [facility] ([facility] ) [phone]    [address] [place], [place] [id number]   May 18, 2022  1:00 PM  Neuro Treatment with Andreia Richter PT  [facility] [place] ([facility] ) [phone]    [address] [place], [place] [id number]   May 18, 2022  2:15 PM  (Arrive by 2:00 PM)  RETURN KNEE with Montana Minaya MD  [facility] ([facility] ) [phone]    [address] [place] [address]   May 19, 2022  9:00 AM  Ostomy Marking/Education  Office Visit with Et Nurse - [facility] [place] ([facility] ) [phone]    [address] [facility] [address]      Jun 13, 2022 11:30 AM  (Arrive by 11:15 AM)  Post-Op with [facility]  "[facility] [place] ([facility] ) [phone]    [address] [address]   Jul 12, 2022  1:30 PM  (Arrive by 1:15 PM)  Post-Op with Rolando Walden MD  [facility] [place] ([facility] ) [phone]    [address] [address]      Pre-surgical Preparation:      MiraLAX/Gatorade, Magnesium Citrate, Antibiotics, Zofran  - see \"Day Before Surgery\"  - obtain medications and ingredients in advance  - if you have diabetes or blood sugar concerns, please use Gatorade ZERO or Low Sugar, as per recommendation by your physician    [place]oma Burnett  [place]-op Coordinator  [place]  Direct Phone: [phone]      " English

## 2022-08-08 NOTE — OB RN DELIVERY SUMMARY - NS_LABORROOM_OBGYN_ALL_OB_FT
LDR 8 Griseofulvin Pregnancy And Lactation Text: This medication is Pregnancy Category X and is known to cause serious birth defects. It is unknown if this medication is excreted in breast milk but breast feeding should be avoided.

## 2023-01-03 NOTE — DISCHARGE NOTE OB - PATIENT PORTAL LINK FT
HISTORY OF PRESENT ILLNESS  Phil Dubois is a 34 y.o. female. Ms. Abdirashid Fofana notes that she has been experiencing a change in bowel habits for about the past 4 months. She indicates that she at times is constipated and other times has loose stools. She has not noted any bloody stools and has had minimal abdominal discomfort. She has not been able to find any pattern of foods that seem to exacerbate the symptoms. 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 She notes that in the past she was treated with venlafaxine for premenstrual dysphoric disorder with good results but is very reluctant to consider any kind of antianxiety/depression medication. She anticipates planning a pregnancy and has discontinued tamoxifen after consultation with her oncologist and obstetrician gynecologist.  The plan is for a 4-month interval off the medication prior to attempting to achieve pregnancy. Mr#: [id number]      Past Medical History:   Diagnosis Date    Anemia     Breast cancer ([facility]     Stage 1 Right breast - BRCA neg    Microcytic anemia 10/1/2018       Past Surgical History:   Procedure Laterality Date    ME BIOPSY BREAST OPEN INCISIONAL      bilateral        Family History   Problem Relation Age of Onset    No Known Problems Mother     No Known Problems Father        No Known Allergies    Social History     Tobacco Use   Smoking Status Never   Smokeless Tobacco Never       Social History     Substance and Sexual Activity   Alcohol Use No         Patient Active Problem List   Diagnosis Code    Sickle-cell trait ([facility] D57.3    Ductal carcinoma of breast ([facility] C50.919         Current Outpatient Medications:     amLODIPine (NORVASC) 5 mg tablet, Take 1 Tablet by mouth daily. , Disp: 90 Tablet, Rfl: 3    multivitamin (ONE A DAY) tablet, Take 1 Tablet by mouth daily. , Disp: , Rfl:     tamoxifen (NOLVADEX) 20 mg tablet, Take 1 Tablet by mouth daily. Holding med for now, Disp: , Rfl: 11       Review of Systems   Constitutional:  Negative for fever and weight loss. Gastrointestinal:  Positive for constipation, diarrhea and heartburn. Negative for abdominal pain, blood in stool, melena, nausea and vomiting. Psychiatric/Behavioral:  Positive for depression. Negative for suicidal ideas. The patient is nervous/anxious. Visit Vitals  /80   Pulse 92   Temp 98.2 °F (36.8 °C) (Temporal)   Resp 16   Ht 5' 3\" (1.6 m)   Wt 196 lb (88.9 kg)   LMP 12/19/2022   SpO2 97%   BMI 34.72 kg/m²       Physical Exam  Vitals and nursing note reviewed. Constitutional:       General: She is not in acute distress. Appearance: Normal appearance. She is well-developed. She is obese. She is not ill-appearing. HENT:      Head: Normocephalic. Eyes:      Extraocular Movements: Extraocular movements intact. Conjunctiva/sclera: Conjunctivae normal.   Cardiovascular:      Rate and Rhythm: Normal rate and regular rhythm. Heart sounds: Normal heart sounds. Pulmonary:      Effort: Pulmonary effort is normal.      Breath sounds: Normal breath sounds. Abdominal:      General: Bowel sounds are normal.      Palpations: Abdomen is soft. Tenderness: There is no abdominal tenderness. Musculoskeletal:      Cervical back: Neck supple. Skin:     General: Skin is warm and dry. Neurological:      Mental Status: She is alert and oriented to person, place, and time. Psychiatric:         Mood and Affect: Mood normal.         Behavior: Behavior normal.         Thought Content: Thought content normal.       ASSESSMENT and PLAN    ICD-10-CM ICD-9-CM    1. Irritable bowel syndrome with both constipation and diarrhea  K58.2 564.1       2. Anxiety  F41.9 300.00 sertraline (ZOLOFT) 25 mg tablet      3. Symptoms of gastroesophageal reflux  R19.8 787.99 famotidine (PEPCID) 40 mg tablet      4.  Personal history of breast cancer  Z85.3 V10.3       Assessment:  Symptoms suggestive of irritable bowel syndrome with alternating constipation and diarrhea  Anxiety exacerbated by situational stress  Symptoms consistent with gastroesophageal reflux  History of breast cancer    Plan:  Follow irritable bowel guidelines, avoid caffeine  Begin sertraline 25 mg daily in the morning  Begin famotidine 40 mg daily in the morning to help prevent reflux symptoms, avoid eating within 3 hours of bedtime  Return for follow-up in 3-4 weeks or sooner with any problems  Please always arrive at least 15 minutes before your scheduled appointment time  Kati Soares MD      PLEASE NOTE:   This document has been produced using voice recognition software. Unrecognized errors in transcription may be present. You can access the FollowMyHealth Patient Portal offered by [facility] by registering at the following website: [email or web address]. By joining [facility]’s FollowMyHealth portal, you will also be able to view your health information using other applications (apps) compatible with our system.

## 2023-03-07 NOTE — ED ADULT TRIAGE NOTE - ARRIVAL FROM
Quality 111:Pneumonia Vaccination Status For Older Adults: Pneumococcal Vaccination not Administered or Previously Received, Reason not Otherwise Specified Detail Level: Detailed Quality 226: Preventive Care And Screening: Tobacco Use: Screening And Cessation Intervention: Patient screened for tobacco use and is an ex/non-smoker Home No. YESSENIA screening performed.  STOP BANG Legend: 0-2 = LOW Risk; 3-4 = INTERMEDIATE Risk; 5-8 = HIGH Risk

## 2023-05-24 NOTE — ED PROVIDER NOTE - NSFOLLOWUPINSTRUCTIONS_ED_ALL_ED_FT
Yes Follow up with your primary care doctor within the next 24-48 hours and bring copy of your results.  Return to the Emergency Department for worsening or persistent symptoms or any other concerns incl. chest pain, shortness of breath, dizziness, inability to tolerate oral intake.  Rest, drink plenty of fluids.  Advance activity as tolerated.  Continue all previously prescribed medications as directed.

## 2023-06-21 NOTE — ED PROVIDER NOTE - PRINCIPAL DIAGNOSIS
CC: f/u for acute cholecystitis and klebsiella bacteremia    Patient reports: he c/o ongoing abdominal pain, he is receiving HD, robotic assisted cholecystectomy is planned    REVIEW OF SYSTEMS:  All other review of systems negative (Comprehensive ROS): abdominal pain    Antimicrobials Day #  :day 2  piperacillin/tazobactam IVPB.. 3.375 Gram(s) IV Intermittent every 12 hours    Other Medications Reviewed    T(F): 97.8 (06-21-23 @ 13:15), Max: 98.5 (06-20-23 @ 22:26)  HR: 58 (06-21-23 @ 13:15)  BP: 144/80 (06-21-23 @ 13:15)  RR: 16 (06-21-23 @ 13:15)  SpO2: 99% (06-21-23 @ 13:15)  Wt(kg): --    PHYSICAL EXAM:  General: alert, no acute distress  Eyes:  anicteric, no conjunctival injection, no discharge  Oropharynx: no lesions or injection 	  Neck: supple, without adenopathy  Lungs: clear to auscultation  Heart: regular rate and rhythm; no murmur, rubs or gallops  Abdomen: soft, tender in RUQ and epigastric area.  Skin: no lesions  Extremities: no clubbing, cyanosis, or edema  Neurologic: alert, oriented, moves all extremities    LAB RESULTS:                        11.4   4.74  )-----------( 147      ( 21 Jun 2023 05:45 )             34.0     06-21    137  |  98  |  70<H>  ----------------------------<  114<H>  4.6   |  28  |  6.23<H>    Ca    9.0      21 Jun 2023 05:45    TPro  6.6  /  Alb  2.9<L>  /  TBili  0.4  /  DBili  x   /  AST  19  /  ALT  35  /  AlkPhos  53  06-21    LIVER FUNCTIONS - ( 21 Jun 2023 05:45 )  Alb: 2.9 g/dL / Pro: 6.6 g/dL / ALK PHOS: 53 U/L / ALT: 35 U/L / AST: 19 U/L / GGT: x           Urinalysis Basic - ( 21 Jun 2023 05:45 )    Color: x / Appearance: x / SG: x / pH: x  Gluc: 114 mg/dL / Ketone: x  / Bili: x / Urobili: x   Blood: x / Protein: x / Nitrite: x   Leuk Esterase: x / RBC: x / WBC x   Sq Epi: x / Non Sq Epi: x / Bacteria: x      MICROBIOLOGY:  RECENT CULTURES:  06-19 @ 22:10 .Blood Blood-Peripheral     No growth to date.      06-17 @ 19:03 .Blood Blood-Peripheral Blood Culture PCR  Klebsiella pneumoniae    No growth to date.    Growth in aerobic bottle: Gram Negative Rods        RADIOLOGY REVIEWED:  < from: CT Chest No Cont (06.20.23 @ 08:47) >  IMPRESSION:  No acute pulmonary pathology.    CTfindings suggest acute cholecystitis. Please correlate for the   presence of a Cuadra sign.    < end of copied text >     Musculoskeletal pain

## 2023-07-03 NOTE — ED ADULT TRIAGE NOTE - CHIEF COMPLAINT QUOTE
" I have the flu symptoms, I have the chills and I have been throwing up and I have been having abdominal pain too" Was The Patient On Physician Recommended Anticoagulation Therapy?: Please Select the Appropriate Response

## 2023-08-03 NOTE — ED ADULT TRIAGE NOTE - NS ED TRIAGE HISTORIAN
GENERAL: no acute distress; well-developed  HEAD:  Atraumatic, Normocephalic  EYES: EOMI, PERRLA, conjunctiva and sclera clear  ENT: MMM; oropharynx clear  NECK: Supple, No JVD  CHEST/LUNG: Clear to auscultation bilaterally; No wheeze  HEART: Regular rate and rhythm; No murmurs, rubs, or gallops  ABDOMEN: Soft, Nontender, Nondistended; Bowel sounds present  EXTREMITIES:  2+ Peripheral Pulses, No clubbing, cyanosis, or edema  PSYCH: AAOx3  NEUROLOGY: no focal motor or sensory deficits. 5/5 muscle strength in all extremities.   SKIN: L scapular induration lesion with erythema and two central puncture wounds
Patient

## 2023-09-11 ENCOUNTER — EMERGENCY (EMERGENCY)
Facility: HOSPITAL | Age: 28
LOS: 0 days | Discharge: HOME | End: 2023-09-11
Attending: EMERGENCY MEDICINE
Payer: COMMERCIAL

## 2023-09-11 VITALS
RESPIRATION RATE: 18 BRPM | WEIGHT: 210.1 LBS | TEMPERATURE: 98 F | HEART RATE: 82 BPM | HEIGHT: 62 IN | OXYGEN SATURATION: 98 %

## 2023-09-11 VITALS
DIASTOLIC BLOOD PRESSURE: 82 MMHG | TEMPERATURE: 99 F | HEART RATE: 88 BPM | OXYGEN SATURATION: 98 % | RESPIRATION RATE: 16 BRPM | SYSTOLIC BLOOD PRESSURE: 118 MMHG

## 2023-09-11 DIAGNOSIS — N93.9 ABNORMAL UTERINE AND VAGINAL BLEEDING, UNSPECIFIED: ICD-10-CM

## 2023-09-11 DIAGNOSIS — Z90.89 ACQUIRED ABSENCE OF OTHER ORGANS: Chronic | ICD-10-CM

## 2023-09-11 DIAGNOSIS — Z87.59 PERSONAL HISTORY OF OTHER COMPLICATIONS OF PREGNANCY, CHILDBIRTH AND THE PUERPERIUM: ICD-10-CM

## 2023-09-11 DIAGNOSIS — Z98.891 HISTORY OF UTERINE SCAR FROM PREVIOUS SURGERY: ICD-10-CM

## 2023-09-11 DIAGNOSIS — Z86.19 PERSONAL HISTORY OF OTHER INFECTIOUS AND PARASITIC DISEASES: ICD-10-CM

## 2023-09-11 DIAGNOSIS — Z88.0 ALLERGY STATUS TO PENICILLIN: ICD-10-CM

## 2023-09-11 DIAGNOSIS — Z87.19 PERSONAL HISTORY OF OTHER DISEASES OF THE DIGESTIVE SYSTEM: ICD-10-CM

## 2023-09-11 DIAGNOSIS — Z87.42 PERSONAL HISTORY OF OTHER DISEASES OF THE FEMALE GENITAL TRACT: ICD-10-CM

## 2023-09-11 DIAGNOSIS — Z90.09 ACQUIRED ABSENCE OF OTHER PART OF HEAD AND NECK: ICD-10-CM

## 2023-09-11 LAB
ALBUMIN SERPL ELPH-MCNC: 3.6 G/DL — SIGNIFICANT CHANGE UP (ref 3.3–5)
ALP SERPL-CCNC: 99 U/L — SIGNIFICANT CHANGE UP (ref 40–120)
ALT FLD-CCNC: 19 U/L — SIGNIFICANT CHANGE UP (ref 12–78)
ANION GAP SERPL CALC-SCNC: 4 MMOL/L — LOW (ref 5–17)
APPEARANCE UR: ABNORMAL
AST SERPL-CCNC: 12 U/L — LOW (ref 15–37)
BACTERIA # UR AUTO: ABNORMAL
BASOPHILS # BLD AUTO: 0.02 K/UL — SIGNIFICANT CHANGE UP (ref 0–0.2)
BASOPHILS # BLD AUTO: 0.02 K/UL — SIGNIFICANT CHANGE UP (ref 0–0.2)
BASOPHILS NFR BLD AUTO: 0.2 % — SIGNIFICANT CHANGE UP (ref 0–2)
BASOPHILS NFR BLD AUTO: 0.2 % — SIGNIFICANT CHANGE UP (ref 0–2)
BILIRUB SERPL-MCNC: 0.3 MG/DL — SIGNIFICANT CHANGE UP (ref 0.2–1.2)
BILIRUB UR-MCNC: NEGATIVE — SIGNIFICANT CHANGE UP
BLD GP AB SCN SERPL QL: SIGNIFICANT CHANGE UP
BUN SERPL-MCNC: 7 MG/DL — SIGNIFICANT CHANGE UP (ref 7–23)
CALCIUM SERPL-MCNC: 8.5 MG/DL — SIGNIFICANT CHANGE UP (ref 8.5–10.1)
CHLORIDE SERPL-SCNC: 108 MMOL/L — SIGNIFICANT CHANGE UP (ref 96–108)
CO2 SERPL-SCNC: 26 MMOL/L — SIGNIFICANT CHANGE UP (ref 22–31)
COLOR SPEC: YELLOW — SIGNIFICANT CHANGE UP
CREAT SERPL-MCNC: 0.74 MG/DL — SIGNIFICANT CHANGE UP (ref 0.5–1.3)
DIFF PNL FLD: ABNORMAL
EGFR: 112 ML/MIN/1.73M2 — SIGNIFICANT CHANGE UP
EOSINOPHIL # BLD AUTO: 0.08 K/UL — SIGNIFICANT CHANGE UP (ref 0–0.5)
EOSINOPHIL # BLD AUTO: 0.1 K/UL — SIGNIFICANT CHANGE UP (ref 0–0.5)
EOSINOPHIL NFR BLD AUTO: 0.6 % — SIGNIFICANT CHANGE UP (ref 0–6)
EOSINOPHIL NFR BLD AUTO: 0.8 % — SIGNIFICANT CHANGE UP (ref 0–6)
EPI CELLS # UR: SIGNIFICANT CHANGE UP
GLUCOSE SERPL-MCNC: 92 MG/DL — SIGNIFICANT CHANGE UP (ref 70–99)
GLUCOSE UR QL: NEGATIVE MG/DL — SIGNIFICANT CHANGE UP
HCG SERPL-ACNC: 1763 MIU/ML — HIGH
HCT VFR BLD CALC: 33.1 % — LOW (ref 34.5–45)
HCT VFR BLD CALC: 37.2 % — SIGNIFICANT CHANGE UP (ref 34.5–45)
HGB BLD-MCNC: 10.8 G/DL — LOW (ref 11.5–15.5)
HGB BLD-MCNC: 11.6 G/DL — SIGNIFICANT CHANGE UP (ref 11.5–15.5)
IMM GRANULOCYTES NFR BLD AUTO: 0.3 % — SIGNIFICANT CHANGE UP (ref 0–0.9)
IMM GRANULOCYTES NFR BLD AUTO: 0.3 % — SIGNIFICANT CHANGE UP (ref 0–0.9)
KETONES UR-MCNC: NEGATIVE — SIGNIFICANT CHANGE UP
LEUKOCYTE ESTERASE UR-ACNC: ABNORMAL
LYMPHOCYTES # BLD AUTO: 24.3 % — SIGNIFICANT CHANGE UP (ref 13–44)
LYMPHOCYTES # BLD AUTO: 26.6 % — SIGNIFICANT CHANGE UP (ref 13–44)
LYMPHOCYTES # BLD AUTO: 3.24 K/UL — SIGNIFICANT CHANGE UP (ref 1–3.3)
LYMPHOCYTES # BLD AUTO: 3.49 K/UL — HIGH (ref 1–3.3)
MCHC RBC-ENTMCNC: 27 PG — SIGNIFICANT CHANGE UP (ref 27–34)
MCHC RBC-ENTMCNC: 27.8 PG — SIGNIFICANT CHANGE UP (ref 27–34)
MCHC RBC-ENTMCNC: 31.2 G/DL — LOW (ref 32–36)
MCHC RBC-ENTMCNC: 32.6 G/DL — SIGNIFICANT CHANGE UP (ref 32–36)
MCV RBC AUTO: 85.1 FL — SIGNIFICANT CHANGE UP (ref 80–100)
MCV RBC AUTO: 86.7 FL — SIGNIFICANT CHANGE UP (ref 80–100)
MONOCYTES # BLD AUTO: 0.47 K/UL — SIGNIFICANT CHANGE UP (ref 0–0.9)
MONOCYTES # BLD AUTO: 0.52 K/UL — SIGNIFICANT CHANGE UP (ref 0–0.9)
MONOCYTES NFR BLD AUTO: 3.6 % — SIGNIFICANT CHANGE UP (ref 2–14)
MONOCYTES NFR BLD AUTO: 3.9 % — SIGNIFICANT CHANGE UP (ref 2–14)
NEUTROPHILS # BLD AUTO: 8.98 K/UL — HIGH (ref 1.8–7.4)
NEUTROPHILS # BLD AUTO: 9.43 K/UL — HIGH (ref 1.8–7.4)
NEUTROPHILS NFR BLD AUTO: 68.5 % — SIGNIFICANT CHANGE UP (ref 43–77)
NEUTROPHILS NFR BLD AUTO: 70.7 % — SIGNIFICANT CHANGE UP (ref 43–77)
NITRITE UR-MCNC: NEGATIVE — SIGNIFICANT CHANGE UP
NRBC # BLD: 0 /100 WBCS — SIGNIFICANT CHANGE UP (ref 0–0)
NRBC # BLD: 0 /100 WBCS — SIGNIFICANT CHANGE UP (ref 0–0)
PH UR: 5 — SIGNIFICANT CHANGE UP (ref 5–8)
PLATELET # BLD AUTO: 383 K/UL — SIGNIFICANT CHANGE UP (ref 150–400)
PLATELET # BLD AUTO: 409 K/UL — HIGH (ref 150–400)
POTASSIUM SERPL-MCNC: 4.2 MMOL/L — SIGNIFICANT CHANGE UP (ref 3.5–5.3)
POTASSIUM SERPL-SCNC: 4.2 MMOL/L — SIGNIFICANT CHANGE UP (ref 3.5–5.3)
PROT SERPL-MCNC: 7.5 GM/DL — SIGNIFICANT CHANGE UP (ref 6–8.3)
PROT UR-MCNC: 30 MG/DL
RBC # BLD: 3.89 M/UL — SIGNIFICANT CHANGE UP (ref 3.8–5.2)
RBC # BLD: 4.29 M/UL — SIGNIFICANT CHANGE UP (ref 3.8–5.2)
RBC # FLD: 15.1 % — HIGH (ref 10.3–14.5)
RBC # FLD: 15.1 % — HIGH (ref 10.3–14.5)
RBC CASTS # UR COMP ASSIST: >50 /HPF (ref 0–4)
SODIUM SERPL-SCNC: 138 MMOL/L — SIGNIFICANT CHANGE UP (ref 135–145)
SP GR SPEC: 1.02 — SIGNIFICANT CHANGE UP (ref 1.01–1.02)
UROBILINOGEN FLD QL: NEGATIVE MG/DL — SIGNIFICANT CHANGE UP
WBC # BLD: 13.1 K/UL — HIGH (ref 3.8–10.5)
WBC # BLD: 13.33 K/UL — HIGH (ref 3.8–10.5)
WBC # FLD AUTO: 13.1 K/UL — HIGH (ref 3.8–10.5)
WBC # FLD AUTO: 13.33 K/UL — HIGH (ref 3.8–10.5)
WBC UR QL: SIGNIFICANT CHANGE UP

## 2023-09-11 PROCEDURE — 99284 EMERGENCY DEPT VISIT MOD MDM: CPT

## 2023-09-11 PROCEDURE — 76856 US EXAM PELVIC COMPLETE: CPT | Mod: 26

## 2023-09-11 NOTE — ED ADULT NURSE NOTE - ED STAT RN HANDOFF DETAILS
Report given to Collette Booth RN. patient in no acute distress. patient in stretcher. Endorsed all concerns to RN. IV site clean dry and intact.

## 2023-09-11 NOTE — ED PROVIDER NOTE - CLINICAL SUMMARY MEDICAL DECISION MAKING FREE TEXT BOX
+vaginal bleeding s/p chemical  x 3 wks without tachycardia/hypotension/anemia/palpitations/sob/lightheadedness/syncope.   pending sono +vaginal bleeding s/p chemical  x 3 wks without tachycardia/hypotension/anemia/palpitations/sob/lightheadedness/syncope.   pending sono      @949pm-- sono with ?retained products. pelvic with mild blood and no active bleeding. pt to see obgyn in am.   vitals remain normal. she understands needs rpt bhcg trending and ob f/u.  hgb stable x 2

## 2023-09-11 NOTE — ED ADULT NURSE NOTE - OBJECTIVE STATEMENT
patient is A&Ox4. Breathing unlabored on RA. PMH fibroids. patient complaining of vaginal bleeding x 3 weeks. reports taking  pill methergine 3 weeks ago. complaining of worsening heavy bleeding since this Thursday with clots. patient reports using about 5 pads a day. Patient denies any sob, difficulty breathing, dizziness, headache, vision changes, cp, palpations, abdominal pain, n/v/d, fever, chills, numbness, tingling, urinary symptoms, LE swelling. denies any other symptoms.

## 2023-09-11 NOTE — ED PROVIDER NOTE - OBJECTIVE STATEMENT
28 y/o F with PMH fibroids, 1 , had chemical  with misoprostol and mifeprostone 3 wks ago presents with persistent vaginal bleeding x 3 wks-- some days heavier, some lighter.    No lightheadedness, chest pain, shortness of breath, palpitations, bleeding from other areas, coagulopathies, abdominal pain.   has f/u appointment with obgyn in 5 days.

## 2023-09-11 NOTE — ED PROVIDER NOTE - PHYSICAL EXAMINATION
PHYSICAL EXAM:    GENERAL: Alert, appears stated age, well appearing, non-toxic  SKIN: Warm, and dry.   HEAD: NC, AT  EYE: Normal lids/conjunctiva, PERRL, EOMI  ENT: Normal hearing, patent oropharynx  NECK: +supple. No meningismus, or JVD  Pulm: Bilateral BS, normal resp effort, no wheezes, stridor, or retractions  CV: RRR, no M/R/G, 2+and = radial pulses  Abd: soft, non-tender, non-distended, no rebound/guarding. no CVA tenderness. no ruq/rlq/llq/luq/pelvic ttp.   Mskel: no erythema, cyanosis, edema. no calf tenderness  Neuro: AAOx3, 5/5 strength throughout. normal gait. PHYSICAL EXAM:    GENERAL: Alert, appears stated age, well appearing, non-toxic  SKIN: Warm, and dry.   HEAD: NC, AT  EYE: Normal lids/conjunctiva, PERRL, EOMI  ENT: Normal hearing, patent oropharynx  NECK: +supple. No meningismus, or JVD  Pulm: Bilateral BS, normal resp effort, no wheezes, stridor, or retractions  CV: RRR, no M/R/G, 2+and = radial pulses  Abd: soft, non-tender, non-distended, no rebound/guarding. no CVA tenderness. no ruq/rlq/llq/luq/pelvic ttp.   PELVIC EXAM: external genitalia WNL, without lesions. mucosa pink and moist. No lesions in vaginal canal/on cervix. cervical os closed with mild blood in vault, no active bleeding, no clots to evacuate. no CMT, adnexal fullness, adnexal TTP. Chaperoned by ADIS Price: no erythema, cyanosis, edema. no calf tenderness  Neuro: AAOx3, 5/5 strength throughout. normal gait.

## 2023-09-11 NOTE — ED ADULT NURSE REASSESSMENT NOTE - NS ED NURSE REASSESS COMMENT FT1
Report received from ADIS Arroyo. Patient is a&ox4 on stretcher. Patient repeat CBC collected and sent. NAD noted at this time. Awaiting results

## 2023-09-11 NOTE — ED PROVIDER NOTE - PATIENT PORTAL LINK FT
You can access the FollowMyHealth Patient Portal offered by [facility] by registering at the following website: [email or web address]. By joining [employer] [facility]’s FollowMyHealth portal, you will also be able to view your health information using other applications (apps) compatible with our system.

## 2023-09-11 NOTE — ED ADULT TRIAGE NOTE - CHIEF COMPLAINT QUOTE
pt c/o vaginal bleeding for 3 weeks. states she took an  pill 3 week ago. states today the bleeding became worst with clots. denies abdominal pain. no history

## 2023-09-11 NOTE — ED PROVIDER NOTE - NSFOLLOWUPINSTRUCTIONS_ED_ALL_ED_FT
Abnormal (Dysfunctional) Uterine Bleeding    WHAT YOU NEED TO KNOW:    What is abnormal uterine bleeding (AUB)? AUB is uterine bleeding that is not usual for you. It may also be called dysfunctional uterine bleeding. You may have bleeding from your uterus at times other than your normal monthly period. Your monthly periods may last longer or shorter, and bleeding may be heavier or lighter than usual. AUB can be acute (lasting a short time) or chronic (lasting longer than 6 months).  Female Reproductive System    What causes AUB? The following can cause or increase your risk for AUB:    Age 40 or older, or 14 or younger    Too much or too little estrogen    An ovary does not release an egg during ovulation    A medical condition, such as polycystic ovary syndrome (PCOS), diabetes, or long-term liver or kidney disease    Not giving birth    A growth, such as a tumor or a polyp    Trauma such as an injury to your uterus or cervix    An infection such as a sexually transmitted infection    An overactive or underactive thyroid or adrenal gland    An eating disorder, such as bulimia or anorexia, or too much exercise    Certain medications, or hormone replacement therapy    A large weight gain or loss  What are the signs and symptoms of AUB?    Bleeding or spotting between periods    Bleeding that starts 12 months or longer after you have been through menopause    The amount of bleeding during your period is heavier or lighter than usual    The number of days that you bleed during your regular period is longer than usual, or more than 7 days    The number of days that you bleed is shorter than usual, or less than 2 days    The time between your monthly periods is shorter or longer than usual  How is AUB diagnosed? Your healthcare provider will ask about your monthly periods. Tell him or her about all changes to your periods. Include when the changes started. Your provider may ask the age you were when you got your first period. He or she may ask if you use tampons or sanitary pads. Tell him or her about any medical conditions you have and all medicines you currently use. You may need any of the following, depending on your age and medical history:    Blood tests may be done to find the cause of your AUB and problems caused by AUB, such as anemia. Your provider may recommend screening for an STI as part of the blood tests.    A pelvic exam may be done to find the source of your bleeding.    A hysteroscopy is a procedure to look at your endometrium. The endometrium is the lining inside of your uterus. Your healthcare provider will insert a small tube with a camera at the end into your uterus.    A biopsy is a procedure to remove a small piece of tissue from the endometrium. The tissue is sent to a lab for tests.    An ultrasound uses sound waves to show pictures of your uterus, ovaries, tubes, and vagina on a monitor.    A pap smear may be needed. Your healthcare provider takes a sample of tissue from your cervix and sends it to a lab for tests.  How is AUB treated?    Medicines may be given to treat a condition causing AUB. An example is medicine to increase or decrease the amount of hormone your thyroid makes. Hormones may be given to help decrease bleeding by making your monthly periods more regular. Sometimes this medicine may be given as birth control pills. Iron supplements may be given if your blood iron level decreases because of heavy bleeding.    Procedures such as endometrial ablation or dilation and curettage may be used to control your bleeding.    Surgery may be needed if medicines do not work or cannot be used. You may need an abdominal or vaginal hysterectomy. A hysterectomy is surgery to remove your uterus.  How do I care for myself at home?    Include foods high in iron if needed. Examples of foods high in iron are leafy green vegetables, beef, pork, liver, eggs, and whole-grain breads and cereals.  Sources of Iron      Keep a diary of your menstrual cycles. Keep track of the number of tampons or pads you use each day.    Talk to your healthcare provider before you start a weight loss program. You may need to wait until the abnormal bleeding has stopped before you try to lose weight. The amount of iron in your blood should be normal before you lose weight. Ask your provider if weight loss will help your AUB. He or she can tell you what weight is healthy for you. He or she can help you create a safe weight loss plan, if needed.  When should I seek immediate care?    You continue to bleed heavily, or you feel faint.    When should I call my doctor or gynecologist?    You need to change your sanitary pad or tampon more than 1 time each hour.    Your medicine causes nausea, vomiting, or diarrhea.    You have questions or concerns about your condition or care.  CARE AGREEMENT:    You have the right to help plan your care. Learn about your health condition and how it may be treated. Discuss treatment options with your healthcare providers to decide what care you want to receive. You always have the right to refuse treatment.

## 2023-09-13 LAB
CULTURE RESULTS: SIGNIFICANT CHANGE UP
SPECIMEN SOURCE: SIGNIFICANT CHANGE UP

## 2024-05-25 NOTE — DISCHARGE NOTE OB - CARE PROVIDER_API CALL
No Kohanim, Behnam  OBSTETRICS AND GYNECOLOGY  [address], [address]  [place] [address]  Phone: [phone]  Fax: [phone]  Follow Up Time:

## 2024-10-02 NOTE — OB PROVIDER TRIAGE NOTE - NSGENETICTESTING_OBGYN_ALL_OB
Appointment Request     Patient requesting earlier appointment: Yes  Appointment offered to patient: 10/16  Patient Contact Number: [phone]     PER PCP, Pt REFERRED TO DR BROWN. HER PAIN IS INCREASING QUICKLY, SO THE Pt HAS MADE AN APPT. IF SHE COULD BE SEEN SOONER, SHE IS WILLING TO COME IN BEFORE 10/16.      PLEASE CALL TO LET THE Pt KNOW IF A SOONER APPT IS POSSIBLE.     Not applicable

## 2025-07-26 ENCOUNTER — NON-APPOINTMENT (OUTPATIENT)
Age: 30
End: 2025-07-26